# Patient Record
Sex: MALE | Race: WHITE | NOT HISPANIC OR LATINO | Employment: OTHER | URBAN - METROPOLITAN AREA
[De-identification: names, ages, dates, MRNs, and addresses within clinical notes are randomized per-mention and may not be internally consistent; named-entity substitution may affect disease eponyms.]

---

## 2017-01-18 ENCOUNTER — OFFICE VISIT (OUTPATIENT)
Dept: FAMILY MEDICINE | Facility: CLINIC | Age: 42
End: 2017-01-18
Payer: COMMERCIAL

## 2017-01-18 VITALS
HEART RATE: 74 BPM | BODY MASS INDEX: 36.2 KG/M2 | WEIGHT: 273.13 LBS | SYSTOLIC BLOOD PRESSURE: 120 MMHG | TEMPERATURE: 98 F | HEIGHT: 73 IN | DIASTOLIC BLOOD PRESSURE: 94 MMHG

## 2017-01-18 DIAGNOSIS — S43.402A SPRAIN SHOULDER/ARM, LEFT, INITIAL ENCOUNTER: Primary | ICD-10-CM

## 2017-01-18 PROCEDURE — 1159F MED LIST DOCD IN RCRD: CPT | Mod: S$GLB,,, | Performed by: FAMILY MEDICINE

## 2017-01-18 PROCEDURE — 99999 PR PBB SHADOW E&M-EST. PATIENT-LVL III: CPT | Mod: PBBFAC,,, | Performed by: FAMILY MEDICINE

## 2017-01-18 PROCEDURE — 3080F DIAST BP >= 90 MM HG: CPT | Mod: S$GLB,,, | Performed by: FAMILY MEDICINE

## 2017-01-18 PROCEDURE — 99213 OFFICE O/P EST LOW 20 MIN: CPT | Mod: S$GLB,,, | Performed by: FAMILY MEDICINE

## 2017-01-18 PROCEDURE — 3074F SYST BP LT 130 MM HG: CPT | Mod: S$GLB,,, | Performed by: FAMILY MEDICINE

## 2017-01-18 NOTE — PROGRESS NOTES
Subjective:       Patient ID: Tl Butts is a 41 y.o. male.    Chief Complaint: Shoulder Pain (pain on left shoulder for 6 months. Certain movements will cause the pain and it will radiate down the arm.Doesn't remember injurying it)    HPI Comments: C/o left anterior shoulder pain for the last 6 months.  He thinks boxing may have re-injured something.  Similar pains in the past.  He stopped boxing 6 months ago.  He feels pain with certain motions only.  It does radiate into the lateral wrist.  He feels entire arm feels weak.  Pain is worse with arm extension.      Past Medical History   Diagnosis Date    Chronic headaches     Elevated cholesterol     Hypertension     Lumbar spondylosis        Past Surgical History   Procedure Laterality Date    Knee surgery  age 16       Review of patient's allergies indicates:   Allergen Reactions    Penicillins      Other reaction(s): Unknown       Social History     Social History    Marital status:      Spouse name: N/A    Number of children: 3    Years of education: N/A     Occupational History    heavy equipment repair shop owner      Social History Main Topics    Smoking status: Never Smoker    Smokeless tobacco: Never Used    Alcohol use No    Drug use: Not on file    Sexual activity: Not on file     Other Topics Concern    Not on file     Social History Narrative       Current Outpatient Prescriptions on File Prior to Visit   Medication Sig Dispense Refill    fenofibrate micronized (LOFIBRA) 134 MG Cap Take 1 capsule (134 mg total) by mouth once daily. 30 capsule 11    fluticasone (FLONASE) 50 mcg/actuation nasal spray 1 spray by Each Nare route once daily. 1 Bottle 5    lisinopril 10 MG tablet Take 1 tablet (10 mg total) by mouth once daily. 30 tablet 11     No current facility-administered medications on file prior to visit.        Family History   Problem Relation Age of Onset    Hepatitis Mother     Diabetes Father     Diabetes Sister   "   Cancer Maternal Grandmother      lung    Cancer Maternal Grandfather      lung    Hepatitis Mother        Review of Systems   Constitutional: Negative for appetite change, chills, fever and unexpected weight change.   HENT: Negative for sore throat and trouble swallowing.    Eyes: Negative for pain and visual disturbance.   Respiratory: Negative for cough, chest tightness, shortness of breath and wheezing.    Cardiovascular: Negative for chest pain, palpitations and leg swelling.   Gastrointestinal: Negative for abdominal pain, blood in stool, constipation, diarrhea and nausea.   Genitourinary: Negative for difficulty urinating, dysuria and hematuria.   Musculoskeletal: Positive for arthralgias. Negative for gait problem and neck pain.   Skin: Negative for rash and wound.   Neurological: Negative for dizziness, weakness, light-headedness and headaches.   Hematological: Negative for adenopathy.   Psychiatric/Behavioral: Negative for dysphoric mood.       Objective:      Visit Vitals    BP (!) 120/94    Pulse 74    Temp 97.8 °F (36.6 °C) (Oral)    Ht 6' 1" (1.854 m)    Wt 123.9 kg (273 lb 2.4 oz)    BMI 36.04 kg/m2     Physical Exam   Musculoskeletal:        Right shoulder: He exhibits normal range of motion, no tenderness, no bony tenderness, no deformity, no pain and no spasm.        Left shoulder: He exhibits pain (anterior and worse with arm extension. pain with resisted abduction). He exhibits normal range of motion.        Arms:      Assessment:       1. Sprain shoulder/arm, left, initial encounter        Plan:       Sprain shoulder/arm, left, initial encounter  -     Ambulatory Referral to Physical/Occupational Therapy        Basic arm stretching and lifting precautions  PT referral for assitance for likely left deltoid strain  F/u PRN if not improving  "

## 2017-06-19 ENCOUNTER — TELEPHONE (OUTPATIENT)
Dept: FAMILY MEDICINE | Facility: CLINIC | Age: 42
End: 2017-06-19

## 2017-06-19 DIAGNOSIS — Z00.00 ROUTINE GENERAL MEDICAL EXAMINATION AT A HEALTH CARE FACILITY: ICD-10-CM

## 2017-06-19 DIAGNOSIS — Z13.6 SCREENING FOR ISCHEMIC HEART DISEASE: Primary | ICD-10-CM

## 2017-06-19 NOTE — TELEPHONE ENCOUNTER
Orders entered per written order guidelines.  Message left for patient to call and scheduled lab appointment.

## 2017-06-19 NOTE — TELEPHONE ENCOUNTER
----- Message from Wanda Salvador sent at 6/19/2017  9:00 AM CDT -----  Order for blood work.  Please call wife/Fanny at [phone]

## 2017-07-03 DIAGNOSIS — E78.5 HYPERLIPIDEMIA: ICD-10-CM

## 2017-07-03 DIAGNOSIS — I10 ESSENTIAL HYPERTENSION: ICD-10-CM

## 2017-07-03 RX ORDER — LISINOPRIL 10 MG/1
TABLET ORAL
Qty: 30 TABLET | Refills: 1 | Status: SHIPPED | OUTPATIENT
Start: 2017-07-03 | End: 2017-08-15 | Stop reason: SDUPTHER

## 2017-07-03 RX ORDER — FENOFIBRATE 134 MG/1
CAPSULE ORAL
Qty: 30 CAPSULE | Refills: 1 | Status: SHIPPED | OUTPATIENT
Start: 2017-07-03 | End: 2017-08-15

## 2017-08-01 DIAGNOSIS — J34.2 DEFLECTED NASAL SEPTUM: ICD-10-CM

## 2017-08-01 DIAGNOSIS — R09.81 NASAL CONGESTION: ICD-10-CM

## 2017-08-02 RX ORDER — FLUTICASONE PROPIONATE 50 MCG
SPRAY, SUSPENSION (ML) NASAL
Qty: 1 BOTTLE | Refills: 6 | Status: SHIPPED | OUTPATIENT
Start: 2017-08-02 | End: 2018-08-15 | Stop reason: SDUPTHER

## 2017-08-07 ENCOUNTER — LAB VISIT (OUTPATIENT)
Dept: LAB | Facility: HOSPITAL | Age: 42
End: 2017-08-07
Attending: FAMILY MEDICINE
Payer: COMMERCIAL

## 2017-08-07 DIAGNOSIS — Z00.00 ROUTINE GENERAL MEDICAL EXAMINATION AT A HEALTH CARE FACILITY: ICD-10-CM

## 2017-08-07 DIAGNOSIS — Z13.6 SCREENING FOR ISCHEMIC HEART DISEASE: ICD-10-CM

## 2017-08-07 LAB
ALBUMIN SERPL BCP-MCNC: 3.8 G/DL
ALP SERPL-CCNC: 84 U/L
ALT SERPL W/O P-5'-P-CCNC: 44 U/L
ANION GAP SERPL CALC-SCNC: 9 MMOL/L
AST SERPL-CCNC: 24 U/L
BILIRUB SERPL-MCNC: 0.4 MG/DL
BUN SERPL-MCNC: 21 MG/DL
CALCIUM SERPL-MCNC: 9.1 MG/DL
CHLORIDE SERPL-SCNC: 109 MMOL/L
CHOLEST/HDLC SERPL: 5.1 {RATIO}
CO2 SERPL-SCNC: 24 MMOL/L
CREAT SERPL-MCNC: 1.1 MG/DL
EST. GFR  (AFRICAN AMERICAN): >60 ML/MIN/1.73 M^2
EST. GFR  (NON AFRICAN AMERICAN): >60 ML/MIN/1.73 M^2
GLUCOSE SERPL-MCNC: 123 MG/DL
HDL/CHOLESTEROL RATIO: 19.4 %
HDLC SERPL-MCNC: 175 MG/DL
HDLC SERPL-MCNC: 34 MG/DL
LDLC SERPL CALC-MCNC: 100.4 MG/DL
NONHDLC SERPL-MCNC: 141 MG/DL
POTASSIUM SERPL-SCNC: 4.6 MMOL/L
PROT SERPL-MCNC: 6.8 G/DL
SODIUM SERPL-SCNC: 142 MMOL/L
TRIGL SERPL-MCNC: 203 MG/DL

## 2017-08-07 PROCEDURE — 80053 COMPREHEN METABOLIC PANEL: CPT

## 2017-08-07 PROCEDURE — 36415 COLL VENOUS BLD VENIPUNCTURE: CPT | Mod: PO

## 2017-08-07 PROCEDURE — 80061 LIPID PANEL: CPT

## 2017-08-15 ENCOUNTER — OFFICE VISIT (OUTPATIENT)
Dept: FAMILY MEDICINE | Facility: CLINIC | Age: 42
End: 2017-08-15
Payer: COMMERCIAL

## 2017-08-15 VITALS
DIASTOLIC BLOOD PRESSURE: 82 MMHG | SYSTOLIC BLOOD PRESSURE: 122 MMHG | TEMPERATURE: 98 F | WEIGHT: 279.13 LBS | HEART RATE: 64 BPM | BODY MASS INDEX: 36.99 KG/M2 | RESPIRATION RATE: 18 BRPM | HEIGHT: 73 IN

## 2017-08-15 DIAGNOSIS — E78.5 HYPERLIPIDEMIA, UNSPECIFIED HYPERLIPIDEMIA TYPE: ICD-10-CM

## 2017-08-15 DIAGNOSIS — Z00.00 PREVENTATIVE HEALTH CARE: Primary | ICD-10-CM

## 2017-08-15 DIAGNOSIS — I10 ESSENTIAL HYPERTENSION: ICD-10-CM

## 2017-08-15 DIAGNOSIS — R73.09 ABNORMAL GLUCOSE: ICD-10-CM

## 2017-08-15 PROCEDURE — 99396 PREV VISIT EST AGE 40-64: CPT | Mod: S$GLB,,, | Performed by: FAMILY MEDICINE

## 2017-08-15 PROCEDURE — 99999 PR PBB SHADOW E&M-EST. PATIENT-LVL III: CPT | Mod: PBBFAC,,, | Performed by: FAMILY MEDICINE

## 2017-08-15 RX ORDER — LISINOPRIL 10 MG/1
TABLET ORAL
Qty: 30 TABLET | Refills: 11 | Status: SHIPPED | OUTPATIENT
Start: 2017-08-15 | End: 2017-11-15 | Stop reason: SDUPTHER

## 2017-08-15 RX ORDER — PHENTERMINE HYDROCHLORIDE 37.5 MG/1
37.5 TABLET ORAL
Qty: 30 TABLET | Refills: 2 | Status: SHIPPED | OUTPATIENT
Start: 2017-08-15 | End: 2017-09-14

## 2017-08-15 NOTE — PROGRESS NOTES
Subjective:       Patient ID: Tl Butts is a 42 y.o. male.    Chief Complaint: Annual Exam    HPI Comments:   Here for annual exam and to f/u on chronic health issues.    HTN - tolerating Lisinopril 10mg daily with good control.  HLD - was tolerating fenofibrate 134 mg daily, but stopped this 2 months ago due to fatigue  Chronic headaches - gets HA 5 times a month; uses Advil PRN    Past Medical History:   Diagnosis Date    Chronic headaches     Elevated cholesterol     Hypertension     Lumbar spondylosis        Past Surgical History:   Procedure Laterality Date    KNEE SURGERY  age 16       Allergies   Allergen Reactions    Penicillins      Other reaction(s): Unknown       Social History     Social History    Marital status:      Spouse name: N/A    Number of children: 3    Years of education: N/A     Occupational History    heavy equipment repair shop owner      Social History Main Topics    Smoking status: Never Smoker    Smokeless tobacco: Never Used    Alcohol use No    Drug use: Unknown    Sexual activity: Not on file     Other Topics Concern    Not on file     Social History Narrative    No narrative on file       Current Outpatient Prescriptions on File Prior to Visit   Medication Sig Dispense Refill    fluticasone (FLONASE) 50 mcg/actuation nasal spray USE 1 SPRAY IN EACH NOSTRIL ONCE DAILY. 1 Bottle 6    [DISCONTINUED] fenofibrate micronized (LOFIBRA) 134 MG Cap TAKE 1 CAPSULE(134 MG) BY MOUTH EVERY DAY 30 capsule 1    [DISCONTINUED] lisinopril 10 MG tablet TAKE 1 TABLET(10 MG) BY MOUTH EVERY DAY 30 tablet 1     No current facility-administered medications on file prior to visit.        Family History   Problem Relation Age of Onset    Hepatitis Mother     Diabetes Father     Diabetes Sister     Cancer Maternal Grandmother      lung    Cancer Maternal Grandfather      lung       Hypertension  Associated symptoms include headaches and palpitations. Pertinent negatives  "include no chest pain, neck pain or shortness of breath.   Hyperlipidemia  Pertinent negatives include no chest pain, myalgias or shortness of breath.     Review of Systems   Constitutional: Negative for fever, appetite change, fatigue and unexpected weight change.   HENT: Negative for ear pain, nosebleeds, congestion, sore throat, rhinorrhea, trouble swallowing, neck pain, postnasal drip, sinus pressure and ear discharge.    Eyes: Negative for pain, discharge, redness and visual disturbance.   Respiratory: Negative for apnea, cough, chest tightness, shortness of breath and wheezing.    Cardiovascular: Negative for chest pain and leg swelling.   Gastrointestinal: Negative for nausea, vomiting, abdominal pain, diarrhea, constipation, blood in stool and abdominal distention.   Musculoskeletal: Negative for myalgias, back pain, joint swelling and gait problem.   Skin: Negative for color change, rash and wound.   Neurological: Negative for dizziness, tremors, seizures, syncope, speech difficulty, weakness, light-headedness and numbness. Regular headaches  Hematological: Negative for adenopathy. Does not bruise/bleed easily.   Psychiatric/Behavioral: Negative for suicidal ideas, hallucinations, behavioral problems and confusion.       Objective:      /82 (BP Location: Left arm, Patient Position: Sitting, BP Method: Large (Manual))   Pulse 64   Temp 98.4 °F (36.9 °C) (Oral)   Resp 18   Ht 6' 1" (1.854 m)   Wt 126.6 kg (279 lb 1.6 oz)   BMI 36.82 kg/m²     Physical Exam   Constitutional: He is oriented to person, place, and time. He appears well-developed and well-nourished. He is active and cooperative.   HENT:   Head: Normocephalic and atraumatic.   Nose: Nose normal.   Mouth/Throat: Oropharynx is clear and moist. No oropharyngeal exudate.   Eyes: Conjunctivae and EOM are normal. Pupils are equal, round, and reactive to light. Right eye exhibits no discharge. Left eye exhibits no discharge. No scleral " icterus.   Neck: Trachea normal, normal range of motion and full passive range of motion without pain. Neck supple. Normal carotid pulses and no JVD present. Carotid bruit is not present. No tracheal deviation present. No mass and no thyromegaly present.   Cardiovascular: Normal rate, regular rhythm, S1 normal, S2 normal, normal heart sounds and intact distal pulses.  Exam reveals no gallop and no friction rub.    No murmur heard.  Pulmonary/Chest: Effort normal and breath sounds normal. No respiratory distress. He has no wheezes. He has no rales.   Abdominal: Soft. Normal appearance and bowel sounds are normal. He exhibits no distension and no mass. There is no hepatosplenomegaly. There is no tenderness. There is no rebound and no guarding. reducible umbilical hernia.  Neurological: He is alert and oriented to person, place, and time. He has normal strength. No cranial nerve deficit. Coordination normal.   Skin: Skin is warm, dry and intact. No rash noted. No cyanosis or erythema. No pallor. Nails show no clubbing.   Psychiatric: He has a normal mood and affect. His speech is normal and behavior is normal. Judgment and thought content normal.       Results for orders placed or performed in visit on 08/07/17   Comprehensive metabolic panel   Result Value Ref Range    Sodium 142 136 - 145 mmol/L    Potassium 4.6 3.5 - 5.1 mmol/L    Chloride 109 95 - 110 mmol/L    CO2 24 23 - 29 mmol/L    Glucose 123 (H) 70 - 110 mg/dL    BUN, Bld 21 (H) 6 - 20 mg/dL    Creatinine 1.1 0.5 - 1.4 mg/dL    Calcium 9.1 8.7 - 10.5 mg/dL    Total Protein 6.8 6.0 - 8.4 g/dL    Albumin 3.8 3.5 - 5.2 g/dL    Total Bilirubin 0.4 0.1 - 1.0 mg/dL    Alkaline Phosphatase 84 55 - 135 U/L    AST 24 10 - 40 U/L    ALT 44 10 - 44 U/L    Anion Gap 9 8 - 16 mmol/L    eGFR if African American >60.0 >60 mL/min/1.73 m^2    eGFR if non African American >60.0 >60 mL/min/1.73 m^2   Lipid panel   Result Value Ref Range    Cholesterol 175 120 - 199 mg/dL     Triglycerides 203 (H) 30 - 150 mg/dL    HDL 34 (L) 40 - 75 mg/dL    LDL Cholesterol 100.4 63.0 - 159.0 mg/dL    HDL/Chol Ratio 19.4 (L) 20.0 - 50.0 %    Total Cholesterol/HDL Ratio 5.1 (H) 2.0 - 5.0    Non-HDL Cholesterol 141 mg/dL     Assessment:       1. Preventative health care    2. Essential hypertension    3. Hyperlipidemia, unspecified hyperlipidemia type    4. Abnormal glucose        Plan:       Preventative health care    Essential hypertension  -     lisinopril 10 MG tablet; TAKE 1 TABLET(10 MG) BY MOUTH EVERY DAY  Dispense: 30 tablet; Refill: 11    Hyperlipidemia, unspecified hyperlipidemia type  -     Comprehensive metabolic panel; Future; Expected date: 11/13/2017  -     Lipid panel; Future; Expected date: 11/13/2017    Abnormal glucose  -     Hemoglobin A1c; Future; Expected date: 11/13/2017    Other orders  -     phentermine (ADIPEX-P) 37.5 mg tablet; Take 1 tablet (37.5 mg total) by mouth before breakfast.  Dispense: 30 tablet; Refill: 2    discussed abnormal glucose  Will try aggressive weight loss  Goal weight 225  Adipex for 3 months with f/u labs then  continue present meds  Counseled on regular exercise, maintenance of a healthy weight, balanced diet rich in fruits/vegetables and lean protein, and avoidance of unhealthy habits like smoking and excessive alcohol intake.      F/u 3 months with CMP, lipid profile, Hgb A1c

## 2017-11-08 ENCOUNTER — LAB VISIT (OUTPATIENT)
Dept: LAB | Facility: HOSPITAL | Age: 42
End: 2017-11-08
Attending: FAMILY MEDICINE
Payer: COMMERCIAL

## 2017-11-08 DIAGNOSIS — R73.09 ABNORMAL GLUCOSE: ICD-10-CM

## 2017-11-08 DIAGNOSIS — E78.5 HYPERLIPIDEMIA, UNSPECIFIED HYPERLIPIDEMIA TYPE: ICD-10-CM

## 2017-11-08 LAB
ALBUMIN SERPL BCP-MCNC: 4 G/DL
ALP SERPL-CCNC: 104 U/L
ALT SERPL W/O P-5'-P-CCNC: 30 U/L
ANION GAP SERPL CALC-SCNC: 6 MMOL/L
AST SERPL-CCNC: 20 U/L
BILIRUB SERPL-MCNC: 0.6 MG/DL
BUN SERPL-MCNC: 20 MG/DL
CALCIUM SERPL-MCNC: 9.6 MG/DL
CHLORIDE SERPL-SCNC: 107 MMOL/L
CHOLEST SERPL-MCNC: 142 MG/DL
CHOLEST/HDLC SERPL: 4.6 {RATIO}
CO2 SERPL-SCNC: 27 MMOL/L
CREAT SERPL-MCNC: 1 MG/DL
EST. GFR  (AFRICAN AMERICAN): >60 ML/MIN/1.73 M^2
EST. GFR  (NON AFRICAN AMERICAN): >60 ML/MIN/1.73 M^2
ESTIMATED AVG GLUCOSE: 105 MG/DL
GLUCOSE SERPL-MCNC: 99 MG/DL
HBA1C MFR BLD HPLC: 5.3 %
HDLC SERPL-MCNC: 31 MG/DL
HDLC SERPL: 21.8 %
LDLC SERPL CALC-MCNC: 87.6 MG/DL
NONHDLC SERPL-MCNC: 111 MG/DL
POTASSIUM SERPL-SCNC: 4.5 MMOL/L
PROT SERPL-MCNC: 7.1 G/DL
SODIUM SERPL-SCNC: 140 MMOL/L
TRIGL SERPL-MCNC: 117 MG/DL

## 2017-11-08 PROCEDURE — 80061 LIPID PANEL: CPT

## 2017-11-08 PROCEDURE — 80053 COMPREHEN METABOLIC PANEL: CPT

## 2017-11-08 PROCEDURE — 83036 HEMOGLOBIN GLYCOSYLATED A1C: CPT

## 2017-11-08 PROCEDURE — 36415 COLL VENOUS BLD VENIPUNCTURE: CPT | Mod: PO

## 2017-11-15 ENCOUNTER — OFFICE VISIT (OUTPATIENT)
Dept: FAMILY MEDICINE | Facility: CLINIC | Age: 42
End: 2017-11-15
Payer: COMMERCIAL

## 2017-11-15 VITALS
WEIGHT: 245.56 LBS | HEIGHT: 73 IN | DIASTOLIC BLOOD PRESSURE: 80 MMHG | SYSTOLIC BLOOD PRESSURE: 112 MMHG | HEART RATE: 65 BPM | OXYGEN SATURATION: 98 % | BODY MASS INDEX: 32.54 KG/M2

## 2017-11-15 DIAGNOSIS — E78.5 HYPERLIPIDEMIA, UNSPECIFIED HYPERLIPIDEMIA TYPE: ICD-10-CM

## 2017-11-15 DIAGNOSIS — I10 ESSENTIAL HYPERTENSION: Primary | ICD-10-CM

## 2017-11-15 DIAGNOSIS — R73.09 ABNORMAL GLUCOSE: ICD-10-CM

## 2017-11-15 PROCEDURE — 99214 OFFICE O/P EST MOD 30 MIN: CPT | Mod: S$GLB,,, | Performed by: FAMILY MEDICINE

## 2017-11-15 PROCEDURE — 99999 PR PBB SHADOW E&M-EST. PATIENT-LVL III: CPT | Mod: PBBFAC,,, | Performed by: FAMILY MEDICINE

## 2017-11-15 RX ORDER — PHENTERMINE HYDROCHLORIDE 37.5 MG/1
1 TABLET ORAL ONCE
Refills: 2 | COMMUNITY
Start: 2017-10-14 | End: 2017-11-15 | Stop reason: SDUPTHER

## 2017-11-15 RX ORDER — PHENTERMINE HYDROCHLORIDE 37.5 MG/1
37.5 TABLET ORAL
Qty: 30 TABLET | Refills: 2 | Status: SHIPPED | OUTPATIENT
Start: 2017-11-15 | End: 2018-02-15

## 2017-11-15 RX ORDER — LISINOPRIL 10 MG/1
5 TABLET ORAL DAILY
Qty: 30 TABLET | Refills: 11
Start: 2017-11-15 | End: 2018-02-15

## 2017-11-15 NOTE — PROGRESS NOTES
Subjective:       Patient ID: Tl Butts is a 42 y.o. male.    Chief Complaint: Hyperlipidemia (3 mos w/labs) and Hypertension    HPI Comments:   Here for f/u on chronic health issues.  Tolerated Adipex for 3 months. Lost 35 pounds in 3 months. Exercising on treadmill 5 days a week.  GERD resolved with weight loss.  HTN - tolerating Lisinopril 10mg daily with good control. Some faintness  HLD - following low-fat diet  Chronic headaches - gets HA 5 times a month; uses Advil PRN    Past Medical History:   Diagnosis Date    Chronic headaches     Elevated cholesterol     Hypertension     Lumbar spondylosis        Past Surgical History:   Procedure Laterality Date    KNEE SURGERY  age 16       Allergies   Allergen Reactions    Penicillins      Other reaction(s): Unknown       Social History     Social History    Marital status:      Spouse name: N/A    Number of children: 3    Years of education: N/A     Occupational History    heavy equipment repair shop owner      Social History Main Topics    Smoking status: Never Smoker    Smokeless tobacco: Never Used    Alcohol use No    Drug use: Unknown    Sexual activity: Not on file     Other Topics Concern    Not on file     Social History Narrative    No narrative on file       Current Outpatient Prescriptions on File Prior to Visit   Medication Sig Dispense Refill    fluticasone (FLONASE) 50 mcg/actuation nasal spray USE 1 SPRAY IN EACH NOSTRIL ONCE DAILY. 1 Bottle 6    [DISCONTINUED] lisinopril 10 MG tablet TAKE 1 TABLET(10 MG) BY MOUTH EVERY DAY 30 tablet 11     No current facility-administered medications on file prior to visit.        Family History   Problem Relation Age of Onset    Hepatitis Mother     Diabetes Father     Diabetes Sister     Cancer Maternal Grandmother      lung    Cancer Maternal Grandfather      lung       Hypertension  Associated symptoms include headaches and palpitations. Pertinent negatives include no chest pain,  "neck pain or shortness of breath.   Hyperlipidemia  Pertinent negatives include no chest pain, myalgias or shortness of breath.     Review of Systems   Constitutional: Negative for fever, appetite change, fatigue and unexpected weight change.   HENT: Negative for ear pain, nosebleeds, congestion, sore throat, rhinorrhea, trouble swallowing, neck pain, postnasal drip, sinus pressure and ear discharge.    Eyes: Negative for pain, discharge, redness and visual disturbance.   Respiratory: Negative for apnea, cough, chest tightness, shortness of breath and wheezing.    Cardiovascular: Negative for chest pain and leg swelling.   Gastrointestinal: Negative for nausea, vomiting, abdominal pain, diarrhea, constipation, blood in stool and abdominal distention.   Musculoskeletal: Negative for myalgias, back pain, joint swelling and gait problem.   Skin: Negative for color change, rash and wound.   Neurological: Negative for dizziness, tremors, seizures, syncope, speech difficulty, weakness, light-headedness and numbness. Regular headaches  Hematological: Negative for adenopathy. Does not bruise/bleed easily.   Psychiatric/Behavioral: Negative for suicidal ideas, hallucinations, behavioral problems and confusion.       Objective:      /80   Pulse 65   Ht 6' 1" (1.854 m)   Wt 111.4 kg (245 lb 9.5 oz)   SpO2 98%   BMI 32.40 kg/m²     Physical Exam   Constitutional: He is oriented to person, place, and time. He appears well-developed and well-nourished. He is active and cooperative.   HENT:   Head: Normocephalic and atraumatic.   Nose: Nose normal.   Mouth/Throat: Oropharynx is clear and moist. No oropharyngeal exudate.   Eyes: Conjunctivae and EOM are normal. Pupils are equal, round, and reactive to light. Right eye exhibits no discharge. Left eye exhibits no discharge. No scleral icterus.   Neck: Trachea normal, normal range of motion and full passive range of motion without pain. Neck supple. Normal carotid pulses " and no JVD present. Carotid bruit is not present. No tracheal deviation present. No mass and no thyromegaly present.   Cardiovascular: Normal rate, regular rhythm, S1 normal, S2 normal, normal heart sounds and intact distal pulses.  Exam reveals no gallop and no friction rub.    No murmur heard.  Pulmonary/Chest: Effort normal and breath sounds normal. No respiratory distress. He has no wheezes. He has no rales.   Abdominal: Soft. Normal appearance and bowel sounds are normal. He exhibits no distension and no mass. There is no hepatosplenomegaly. There is no tenderness. There is no rebound and no guarding. reducible umbilical hernia.  Neurological: He is alert and oriented to person, place, and time. He has normal strength. No cranial nerve deficit. Coordination normal.   Skin: Skin is warm, dry and intact. No rash noted. No cyanosis or erythema. No pallor. Nails show no clubbing.   Psychiatric: He has a normal mood and affect. His speech is normal and behavior is normal. Judgment and thought content normal.       Results for orders placed or performed in visit on 11/08/17   Comprehensive metabolic panel   Result Value Ref Range    Sodium 140 136 - 145 mmol/L    Potassium 4.5 3.5 - 5.1 mmol/L    Chloride 107 95 - 110 mmol/L    CO2 27 23 - 29 mmol/L    Glucose 99 70 - 110 mg/dL    BUN, Bld 20 6 - 20 mg/dL    Creatinine 1.0 0.5 - 1.4 mg/dL    Calcium 9.6 8.7 - 10.5 mg/dL    Total Protein 7.1 6.0 - 8.4 g/dL    Albumin 4.0 3.5 - 5.2 g/dL    Total Bilirubin 0.6 0.1 - 1.0 mg/dL    Alkaline Phosphatase 104 55 - 135 U/L    AST 20 10 - 40 U/L    ALT 30 10 - 44 U/L    Anion Gap 6 (L) 8 - 16 mmol/L    eGFR if African American >60.0 >60 mL/min/1.73 m^2    eGFR if non African American >60.0 >60 mL/min/1.73 m^2   Hemoglobin A1c   Result Value Ref Range    Hemoglobin A1C 5.3 4.0 - 5.6 %    Estimated Avg Glucose 105 68 - 131 mg/dL   Lipid panel   Result Value Ref Range    Cholesterol 142 120 - 199 mg/dL    Triglycerides 117 30 -  150 mg/dL    HDL 31 (L) 40 - 75 mg/dL    LDL Cholesterol 87.6 63.0 - 159.0 mg/dL    HDL/Chol Ratio 21.8 20.0 - 50.0 %    Total Cholesterol/HDL Ratio 4.6 2.0 - 5.0    Non-HDL Cholesterol 111 mg/dL     Assessment:       1. Essential hypertension    2. Hyperlipidemia, unspecified hyperlipidemia type    3. Abnormal glucose        Plan:       Essential hypertension  -     lisinopril 10 MG tablet; Take 0.5 tablets (5 mg total) by mouth once daily.  Dispense: 30 tablet; Refill: 11    Hyperlipidemia, unspecified hyperlipidemia type    Abnormal glucose    Other orders  -     phentermine (ADIPEX-P) 37.5 mg tablet; Take 1 tablet (37.5 mg total) by mouth before breakfast.  Dispense: 30 tablet; Refill: 2    doing well  Continue weight loss efforts  Goal weight 225  Adipex for 3 months  Reduce lisinopril to 5mg due to periodic hypotension  Counseled on regular exercise, maintenance of a healthy weight, balanced diet rich in fruits/vegetables and lean protein, and avoidance of unhealthy habits like smoking and excessive alcohol intake.    F/u 3 months with weight log

## 2018-02-15 ENCOUNTER — OFFICE VISIT (OUTPATIENT)
Dept: FAMILY MEDICINE | Facility: CLINIC | Age: 43
End: 2018-02-15
Payer: COMMERCIAL

## 2018-02-15 VITALS
HEIGHT: 73 IN | HEART RATE: 82 BPM | WEIGHT: 235.69 LBS | RESPIRATION RATE: 18 BRPM | SYSTOLIC BLOOD PRESSURE: 122 MMHG | OXYGEN SATURATION: 99 % | DIASTOLIC BLOOD PRESSURE: 90 MMHG | BODY MASS INDEX: 31.24 KG/M2

## 2018-02-15 DIAGNOSIS — I10 ESSENTIAL HYPERTENSION: Primary | ICD-10-CM

## 2018-02-15 PROCEDURE — 99213 OFFICE O/P EST LOW 20 MIN: CPT | Mod: S$GLB,,, | Performed by: FAMILY MEDICINE

## 2018-02-15 PROCEDURE — 99999 PR PBB SHADOW E&M-EST. PATIENT-LVL III: CPT | Mod: PBBFAC,,, | Performed by: FAMILY MEDICINE

## 2018-02-15 PROCEDURE — 3008F BODY MASS INDEX DOCD: CPT | Mod: S$GLB,,, | Performed by: FAMILY MEDICINE

## 2018-02-15 NOTE — PROGRESS NOTES
Subjective:       Patient ID: Tl Butts is a 42 y.o. male.    Chief Complaint: Follow-up (3 mos)    HPI Comments:   Here for f/u on chronic health issues.  Tolerated Adipex for 6 months. Lost 45 pounds in 6 months and 10 pounds in the last 3 months. Exercising on treadmill 5 days a week.  Doing some weight training now.  GERD resolved with weight loss.  HTN - he stopped Lisinopril 5mg daily 2 months ago due to persistent faintness. Home BP has been normal.  HLD - following low-fat diet  Chronic headaches - gets HA 5 times a month; uses Advil PRN    Past Medical History:   Diagnosis Date    Chronic headaches     Elevated cholesterol     Hypertension     Lumbar spondylosis        Past Surgical History:   Procedure Laterality Date    KNEE SURGERY  age 16       Allergies   Allergen Reactions    Penicillins      Other reaction(s): Unknown       Social History     Social History    Marital status:      Spouse name: N/A    Number of children: 3    Years of education: N/A     Occupational History    heavy equipment repair shop owner      Social History Main Topics    Smoking status: Never Smoker    Smokeless tobacco: Never Used    Alcohol use No    Drug use: Unknown    Sexual activity: Not on file     Other Topics Concern    Not on file     Social History Narrative    No narrative on file       Current Outpatient Prescriptions on File Prior to Visit   Medication Sig Dispense Refill    [DISCONTINUED] phentermine (ADIPEX-P) 37.5 mg tablet Take 1 tablet (37.5 mg total) by mouth before breakfast. 30 tablet 2    fluticasone (FLONASE) 50 mcg/actuation nasal spray USE 1 SPRAY IN EACH NOSTRIL ONCE DAILY. 1 Bottle 6    [DISCONTINUED] lisinopril 10 MG tablet Take 0.5 tablets (5 mg total) by mouth once daily. 30 tablet 11     No current facility-administered medications on file prior to visit.        Family History   Problem Relation Age of Onset    Hepatitis Mother     Diabetes Father     Diabetes  "Sister     Cancer Maternal Grandmother      lung    Cancer Maternal Grandfather      lung       Hypertension  Associated symptoms include headaches and palpitations. Pertinent negatives include no chest pain, neck pain or shortness of breath.     Review of Systems   Constitutional: Negative for fever, appetite change, fatigue and unexpected weight change.   HENT: Negative for ear pain, nosebleeds, congestion, sore throat, rhinorrhea, trouble swallowing, neck pain, postnasal drip, sinus pressure and ear discharge.    Eyes: Negative for pain, discharge, redness and visual disturbance.   Respiratory: Negative for apnea, cough, chest tightness, shortness of breath and wheezing.    Cardiovascular: Negative for chest pain and leg swelling.   Gastrointestinal: Negative for nausea, vomiting, abdominal pain, diarrhea, constipation, blood in stool and abdominal distention.   Musculoskeletal: Negative for myalgias, back pain, joint swelling and gait problem.   Skin: Negative for color change, rash and wound.   Neurological: Negative for dizziness, tremors, seizures, syncope, speech difficulty, weakness, light-headedness and numbness. Regular headaches  Hematological: Negative for adenopathy. Does not bruise/bleed easily.   Psychiatric/Behavioral: Negative for suicidal ideas, hallucinations, behavioral problems and confusion.       Objective:      BP (!) 122/90   Pulse 82   Resp 18   Ht 6' 1" (1.854 m)   Wt 106.9 kg (235 lb 10.8 oz)   SpO2 99%   BMI 31.09 kg/m²     Physical Exam   Constitutional: He is oriented to person, place, and time. He appears well-developed and well-nourished. He is active and cooperative.   HENT:   Head: Normocephalic and atraumatic.   Nose: Nose normal.   Mouth/Throat: Oropharynx is clear and moist. No oropharyngeal exudate.   Eyes: Conjunctivae and EOM are normal. Pupils are equal, round, and reactive to light. Right eye exhibits no discharge. Left eye exhibits no discharge. No scleral " icterus.   Neck: Trachea normal, normal range of motion and full passive range of motion without pain. Neck supple. Normal carotid pulses and no JVD present. Carotid bruit is not present. No tracheal deviation present. No mass and no thyromegaly present.   Cardiovascular: Normal rate, regular rhythm, S1 normal, S2 normal, normal heart sounds and intact distal pulses.  Exam reveals no gallop and no friction rub.    No murmur heard.  Pulmonary/Chest: Effort normal and breath sounds normal. No respiratory distress. He has no wheezes. He has no rales.   Abdominal: Soft. Normal appearance and bowel sounds are normal. He exhibits no distension and no mass. There is no hepatosplenomegaly. There is no tenderness. There is no rebound and no guarding. reducible umbilical hernia.  Neurological: He is alert and oriented to person, place, and time. He has normal strength. No cranial nerve deficit. Coordination normal.   Skin: Skin is warm, dry and intact. No rash noted. No cyanosis or erythema. No pallor. Nails show no clubbing.   Psychiatric: He has a normal mood and affect. His speech is normal and behavior is normal. Judgment and thought content normal.       Results for orders placed or performed in visit on 11/08/17   Comprehensive metabolic panel   Result Value Ref Range    Sodium 140 136 - 145 mmol/L    Potassium 4.5 3.5 - 5.1 mmol/L    Chloride 107 95 - 110 mmol/L    CO2 27 23 - 29 mmol/L    Glucose 99 70 - 110 mg/dL    BUN, Bld 20 6 - 20 mg/dL    Creatinine 1.0 0.5 - 1.4 mg/dL    Calcium 9.6 8.7 - 10.5 mg/dL    Total Protein 7.1 6.0 - 8.4 g/dL    Albumin 4.0 3.5 - 5.2 g/dL    Total Bilirubin 0.6 0.1 - 1.0 mg/dL    Alkaline Phosphatase 104 55 - 135 U/L    AST 20 10 - 40 U/L    ALT 30 10 - 44 U/L    Anion Gap 6 (L) 8 - 16 mmol/L    eGFR if African American >60.0 >60 mL/min/1.73 m^2    eGFR if non African American >60.0 >60 mL/min/1.73 m^2   Hemoglobin A1c   Result Value Ref Range    Hemoglobin A1C 5.3 4.0 - 5.6 %     Estimated Avg Glucose 105 68 - 131 mg/dL   Lipid panel   Result Value Ref Range    Cholesterol 142 120 - 199 mg/dL    Triglycerides 117 30 - 150 mg/dL    HDL 31 (L) 40 - 75 mg/dL    LDL Cholesterol 87.6 63.0 - 159.0 mg/dL    HDL/Chol Ratio 21.8 20.0 - 50.0 %    Total Cholesterol/HDL Ratio 4.6 2.0 - 5.0    Non-HDL Cholesterol 111 mg/dL     Assessment:       1. Essential hypertension        Plan:       Essential hypertension    doing well  Continue weight loss efforts  Stop Adipex now since he has plataued  Goal weight 225  continue observation off BP medication  Counseled on regular exercise, maintenance of a healthy weight, balanced diet rich in fruits/vegetables and lean protein, and avoidance of unhealthy habits like smoking and excessive alcohol intake.  F/u 9 months for physical with labs

## 2018-04-24 ENCOUNTER — TELEPHONE (OUTPATIENT)
Dept: FAMILY MEDICINE | Facility: CLINIC | Age: 43
End: 2018-04-24

## 2018-04-24 NOTE — TELEPHONE ENCOUNTER
----- Message from Deidre Jefferson sent at 4/24/2018 10:47 AM CDT -----  Contact: Wife,Fanny Escobedo want to speak with a nurse regarding patient having back pain need some medical advice please call back at [phone]

## 2018-04-24 NOTE — TELEPHONE ENCOUNTER
Spoke to patient's wife. Patient is having back pain and she wants to know if Dr. Bell would see him first put in a referral. Informed her since this is a new problem Dr. Bell would need to see him first for the referral. She verbalized understanding. She consulted with patient about scheduling an appt with NP while on the phone. Patient says he would like to wait for Dr. Bell. Scheduled appt for patient to see Dr. Bell.

## 2018-05-22 ENCOUNTER — OFFICE VISIT (OUTPATIENT)
Dept: FAMILY MEDICINE | Facility: CLINIC | Age: 43
End: 2018-05-22
Payer: COMMERCIAL

## 2018-05-22 VITALS
HEIGHT: 73 IN | DIASTOLIC BLOOD PRESSURE: 90 MMHG | BODY MASS INDEX: 31.99 KG/M2 | SYSTOLIC BLOOD PRESSURE: 120 MMHG | RESPIRATION RATE: 18 BRPM | HEART RATE: 63 BPM | WEIGHT: 241.38 LBS | OXYGEN SATURATION: 99 %

## 2018-05-22 DIAGNOSIS — M54.16 LUMBAR RADICULOPATHY, RIGHT: Primary | ICD-10-CM

## 2018-05-22 PROCEDURE — 3008F BODY MASS INDEX DOCD: CPT | Mod: CPTII,S$GLB,, | Performed by: FAMILY MEDICINE

## 2018-05-22 PROCEDURE — 99213 OFFICE O/P EST LOW 20 MIN: CPT | Mod: S$GLB,,, | Performed by: FAMILY MEDICINE

## 2018-05-22 PROCEDURE — 99999 PR PBB SHADOW E&M-EST. PATIENT-LVL III: CPT | Mod: PBBFAC,,, | Performed by: FAMILY MEDICINE

## 2018-05-22 PROCEDURE — 3074F SYST BP LT 130 MM HG: CPT | Mod: CPTII,S$GLB,, | Performed by: FAMILY MEDICINE

## 2018-05-22 PROCEDURE — 3080F DIAST BP >= 90 MM HG: CPT | Mod: CPTII,S$GLB,, | Performed by: FAMILY MEDICINE

## 2018-05-22 NOTE — PROGRESS NOTES
Subjective:       Patient ID: Tl Butts is a 42 y.o. male.    Chief Complaint: Back Pain    C/o mostly right lower back sharp pain which seems to have progressed for the last year. It is constant 3/10, but worsens with prolonged standing and prolonged sitting.  It does radiate into the buttock and right groin and occasionally into the upper outer leg. The radiating pain feels like a burning.  Denies any weakness.  This is different from his regular back pain.  Not taking any regular medications.  It occasionally interferes with sleep  He did have MRI by chiropracter about 5 years ago. Surgery was suggested at that time.  He has h/o lumbar spondylosis with intermittent back pain for the past 10 years.        Past Medical History:   Diagnosis Date    Chronic headaches     Elevated cholesterol     Hypertension     Lumbar spondylosis        Past Surgical History:   Procedure Laterality Date    KNEE SURGERY  age 16       Review of patient's allergies indicates:   Allergen Reactions    Penicillins      Other reaction(s): Unknown       Social History     Social History    Marital status:      Spouse name: N/A    Number of children: 3    Years of education: N/A     Occupational History    heavy equipment repair shop owner      Social History Main Topics    Smoking status: Never Smoker    Smokeless tobacco: Never Used    Alcohol use No    Drug use: Unknown    Sexual activity: Not on file     Other Topics Concern    Not on file     Social History Narrative    No narrative on file       Current Outpatient Prescriptions on File Prior to Visit   Medication Sig Dispense Refill    fluticasone (FLONASE) 50 mcg/actuation nasal spray USE 1 SPRAY IN EACH NOSTRIL ONCE DAILY. 1 Bottle 6     No current facility-administered medications on file prior to visit.        Family History   Problem Relation Age of Onset    Hepatitis Mother     Diabetes Father     Diabetes Sister     Cancer Maternal Grandmother   "       lung    Cancer Maternal Grandfather         lung       Review of Systems   Constitutional: Negative for appetite change, chills, fever and unexpected weight change.   HENT: Negative for sore throat and trouble swallowing.    Eyes: Negative for pain and visual disturbance.   Respiratory: Negative for cough, chest tightness, shortness of breath and wheezing.    Cardiovascular: Negative for chest pain, palpitations and leg swelling.   Gastrointestinal: Negative for abdominal pain, blood in stool, constipation, diarrhea and nausea.   Genitourinary: Negative for difficulty urinating, dysuria and hematuria.   Musculoskeletal: Positive for back pain. Negative for arthralgias, gait problem and neck pain.   Skin: Negative for rash and wound.   Neurological: Negative for dizziness, weakness, light-headedness and headaches.   Hematological: Negative for adenopathy.   Psychiatric/Behavioral: Negative for dysphoric mood.       Objective:      BP (!) 120/90   Pulse 63   Resp 18   Ht 6' 1" (1.854 m)   Wt 109.5 kg (241 lb 6.5 oz)   SpO2 99%   BMI 31.85 kg/m²   Physical Exam   Constitutional: He is oriented to person, place, and time. He appears well-developed and well-nourished. He is active. No distress.   HENT:   Head: Normocephalic and atraumatic.   Right Ear: External ear normal.   Left Ear: External ear normal.   Mouth/Throat: Uvula is midline, oropharynx is clear and moist and mucous membranes are normal. No oropharyngeal exudate.   Eyes: Conjunctivae, EOM and lids are normal. Pupils are equal, round, and reactive to light.   Neck: Normal range of motion, full passive range of motion without pain and phonation normal. Neck supple. No thyroid mass and no thyromegaly present.   Cardiovascular: Normal rate, regular rhythm, normal heart sounds and intact distal pulses.  Exam reveals no gallop and no friction rub.    No murmur heard.  Pulmonary/Chest: Effort normal and breath sounds normal. No respiratory distress. " He has no wheezes. He has no rales.   Musculoskeletal: Normal range of motion.        Back:    Negative SLR bilaterally   Lymphadenopathy:     He has no cervical adenopathy.   Neurological: He is alert and oriented to person, place, and time. He has normal strength. No cranial nerve deficit or sensory deficit. Coordination normal.   Reflex Scores:       Patellar reflexes are 2+ on the right side and 2+ on the left side.       Achilles reflexes are 2+ on the right side and 2+ on the left side.  Skin: Skin is warm and dry.   Psychiatric: He has a normal mood and affect. His speech is normal and behavior is normal. Judgment and thought content normal.       Assessment:       1. Lumbar radiculopathy, right        Plan:       Lumbar radiculopathy, right  -     MRI Lumbar Spine Without Contrast; Future; Expected date: 05/22/2018        Suspect likely right L2/L3 lumbar DDD  Discussed regular stretching and lifting precautions  declines medication  Will call with MRI results and further guidance

## 2018-06-11 ENCOUNTER — HOSPITAL ENCOUNTER (OUTPATIENT)
Dept: RADIOLOGY | Facility: HOSPITAL | Age: 43
Discharge: HOME | End: 2018-06-11
Attending: FAMILY MEDICINE
Payer: COMMERCIAL

## 2018-06-11 DIAGNOSIS — M54.16 LUMBAR RADICULOPATHY, RIGHT: ICD-10-CM

## 2018-06-11 PROCEDURE — 72148 MRI LUMBAR SPINE W/O DYE: CPT | Mod: TC,PO

## 2018-06-11 PROCEDURE — 72148 MRI LUMBAR SPINE W/O DYE: CPT | Mod: 26,,, | Performed by: RADIOLOGY

## 2018-06-14 ENCOUNTER — TELEPHONE (OUTPATIENT)
Dept: FAMILY MEDICINE | Facility: CLINIC | Age: 43
End: 2018-06-14

## 2018-06-14 DIAGNOSIS — M54.16 LUMBAR RADICULOPATHY, RIGHT: Primary | ICD-10-CM

## 2018-06-14 NOTE — TELEPHONE ENCOUNTER
----- Message from Wily Strong sent at 6/14/2018  3:28 PM CDT -----  Contact: same  Type:  Test Results    Who Called:  patient  Name of Test (Lab/Mammo/Etc):  MRI  Date of Test:  6/11/18  Ordering Provider:  Arabella  Where the test was performed:  [address] [facility] [address]  Best Call Back Number:  [phone]  Additional Information:  n/a

## 2018-06-15 NOTE — TELEPHONE ENCOUNTER
Inform patient that MRI did show some bulging discs in the lower lumbar spine as well as some arthritis.  The findings were mild to moderate, but are likely generating his pain.  Nothing looks surgical, so I would consider conservative pain management at this time.  I would recommend consultation with one of our interventional pain specialists to consider an injection and have entered a referral.

## 2018-07-31 PROBLEM — M54.41 CHRONIC BILATERAL LOW BACK PAIN WITH BILATERAL SCIATICA: Status: ACTIVE | Noted: 2018-07-31

## 2018-07-31 PROBLEM — M47.816 LUMBAR SPONDYLOSIS: Status: ACTIVE | Noted: 2018-07-31

## 2018-07-31 PROBLEM — E66.9 OBESITY (BMI 30-39.9): Status: ACTIVE | Noted: 2018-07-31

## 2018-07-31 PROBLEM — G89.29 CHRONIC BILATERAL LOW BACK PAIN WITH BILATERAL SCIATICA: Status: ACTIVE | Noted: 2018-07-31

## 2018-07-31 PROBLEM — M51.369 DDD (DEGENERATIVE DISC DISEASE), LUMBAR: Status: ACTIVE | Noted: 2018-07-31

## 2018-07-31 PROBLEM — M54.42 CHRONIC BILATERAL LOW BACK PAIN WITH BILATERAL SCIATICA: Status: ACTIVE | Noted: 2018-07-31

## 2018-07-31 PROBLEM — M48.061 NEUROFORAMINAL STENOSIS OF LUMBAR SPINE: Status: ACTIVE | Noted: 2018-07-31

## 2018-07-31 PROBLEM — M51.36 DDD (DEGENERATIVE DISC DISEASE), LUMBAR: Status: ACTIVE | Noted: 2018-07-31

## 2018-07-31 NOTE — PROGRESS NOTES
[facility] New Patient Evaluation    Referred by: Nixon Bell MD  Reason for referral: M54.16 (ICD-10-CM) - Lumbar radiculopathy, right    CC:   Chief Complaint   Patient presents with    Low-back Pain     mostly to right side/radiating to right buttock     Last 3 PDI Scores 8/1/2018   Pain Disability Index (PDI) 33       HPI:   Tl Butts is a 43 y.o. male who complains of clbp on the right side radiating into the legs for more than 1 year.    Onset: 1+ years ago not associated with trauma, fall, or heavy lifting  Current Pain Score: 3/10  Typical Range: 3-10/10  Quality: Sharp, Stabbing  Radiation: from upper lumbar area to right side radiating into the right buttock  Worsened by: physical activty- lifting, bending; certain sitting or standing positions  Improved by: rest and sitting in certain positions    Previous Therapies:  PT/OT: Denies  HEP: Yes, but not in the past month due to pain (treadmill, light weight lifting)  Interventions: Denies  Surgery: Denies  Medications:   - NSAIDS: Ibuprofen, Aleve, Tylenol - provided no relief so stopped  - MSK Relaxants:   - TCAs:   - SNRIs:   - Topicals:   - Anticonvulsants:  - Opioids:     Current Pain Medications:  1. None     Review of Systems:  Review of Systems   Constitutional: Negative for chills and fever.   HENT: Negative for nosebleeds.    Eyes: Negative for pain.   Respiratory: Negative for hemoptysis.    Cardiovascular: Negative for chest pain.   Gastrointestinal: Negative for nausea and vomiting.   Genitourinary: Negative for dysuria.   Musculoskeletal: Positive for back pain and myalgias.   Skin: Negative for rash.   Neurological: Negative for tingling, tremors and focal weakness.   Endo/Heme/Allergies: Does not bruise/bleed easily.   Psychiatric/Behavioral: Negative for depression. The patient is not nervous/anxious and does not have insomnia.        History:    Current Outpatient Prescriptions:     fluticasone (FLONASE) 50  "mcg/actuation nasal spray, USE 1 SPRAY IN EACH NOSTRIL ONCE DAILY., Disp: 1 Bottle, Rfl: 6    Past Medical History:   Diagnosis Date    Chronic headaches     Elevated cholesterol     Hypertension     Lumbar spondylosis        Past Surgical History:   Procedure Laterality Date    KNEE SURGERY  age 16       Family History   Problem Relation Age of Onset    Hepatitis Mother     Diabetes Father     Diabetes Sister     Cancer Maternal Grandmother         lung    Cancer Maternal Grandfather         lung       Social History     Social History    Marital status:      Spouse name: N/A    Number of children: 3    Years of education: N/A     Occupational History    heavy equipment repair shop owner      Social History Main Topics    Smoking status: Never Smoker    Smokeless tobacco: Never Used    Alcohol use No    Drug use: Unknown    Sexual activity: Not on file     Other Topics Concern    Not on file     Social History Narrative    No narrative on file       Review of patient's allergies indicates:   Allergen Reactions    Penicillins      Other reaction(s): Unknown       Physical Exam:  Vitals:    08/01/18 0915   BP: (!) 176/102   Pulse: 70   Weight: 109.6 kg (241 lb 10 oz)   Height: 6' 1" (1.854 m)   PainSc:   5     General    Nursing note and vitals reviewed.  Constitutional: He is oriented to person, place, and time. He appears well-developed and well-nourished. No distress.   HENT:   Head: Normocephalic and atraumatic.   Nose: Nose normal.   Eyes: Conjunctivae and EOM are normal. Pupils are equal, round, and reactive to light. Right eye exhibits no discharge. Left eye exhibits no discharge. No scleral icterus.   Neck: No JVD present.   Cardiovascular: Intact distal pulses.    Pulmonary/Chest: Effort normal. No respiratory distress.   Abdominal: He exhibits no distension.   Neurological: He is alert and oriented to person, place, and time. Coordination normal.   Psychiatric: He has a normal " mood and affect. His behavior is normal. Judgment and thought content normal.     General Musculoskeletal Exam   Gait: normal     Back (L-Spine & T-Spine) / Neck (C-Spine) Exam     Tenderness Right paramedian tenderness of the Upper L-Spine. Left paramedian tenderness of the Upper L-Spine.     Back (L-Spine & T-Spine) Range of Motion   Extension: normal   Flexion: normal   Lateral Bend Right: normal   Lateral Bend Left: normal   Rotation Right: normal   Rotation Left: normal     Comments:  SLR negative bilaterally      Muscle Strength   Right Lower Extremity   Hip Abduction: 5/5   Hip Flexion: 5/5   Quadriceps:  5/5   Hamstrin/5   Anterior tibial:  5/5/5  Gastrocsoleus:  5/5/5  Left Lower Extremity   Hip Abduction: 5/5   Hip Flexion: 5/5   Quadriceps:  5/5   Hamstrin/5   Anterior tibial:  5/5/5   Gastrocsoleus:  5/5/5    Reflexes     Left Side  Quadriceps:  2+    Right Side   Quadriceps:  2+      Imaging:  MRI Lumbar Spine Without Contrast 2018  1. There is multilevel degenerative change discussed above.  There is no fracture.  There is only trace retrolisthesis of L5 on S1 which is likely physiologic.  There is multilevel facet joint arthropathy.  The pertinent findings are summarized below.  There is no spinal canal or significant foraminal stenosis at the L1-2, L2-3 levels.  There is no spinal stenosis at the T11-T12 level.  2. At the T12-L1 level, right-sided facet joint arthropathy with ligamentum flavum thickening narrows the right dorsal subarachnoid space and left could affect the right dorsal nerve root.  Correlate clinically.  Otherwise, there is no significant spinal canal or foraminal stenosis.  3. At the L3-4 level, there is mild right foraminal stenosis without spinal stenosis.  4. At the L4-5 level, there is mild-to-moderate right foraminal stenosis without spinal stenosis.  5. At the L5-S1 level, there is no significant spinal stenosis but there is crowding of the left lateral recess  and there is moderate left foraminal stenosis.      Labs:  BMP  Lab Results   Component Value Date     11/08/2017    K 4.5 11/08/2017     11/08/2017    CO2 27 11/08/2017    BUN 20 11/08/2017    CREATININE 1.0 11/08/2017    CALCIUM 9.6 11/08/2017    ANIONGAP 6 (L) 11/08/2017    ESTGFRAFRICA >60.0 11/08/2017    EGFRNONAA >60.0 11/08/2017     Lab Results   Component Value Date    ALT 30 11/08/2017    AST 20 11/08/2017    ALKPHOS 104 11/08/2017    BILITOT 0.6 11/08/2017       Assessment:  Problem List Items Addressed This Visit     Neuroforaminal stenosis of lumbar spine    Chronic bilateral low back pain without sciatica    Relevant Medications    tiZANidine (ZANAFLEX) 2 MG tablet    Other Relevant Orders    Ambulatory Referral to Physical/Occupational Therapy    Lumbar spondylosis    Relevant Orders    Ambulatory Referral to Physical/Occupational Therapy    DDD (degenerative disc disease), lumbar    Myofascial pain syndrome - Primary    Relevant Medications    tiZANidine (ZANAFLEX) 2 MG tablet    Other Relevant Orders    Ambulatory Referral to Physical/Occupational Therapy          This is a very pleasant 43-year-old male with a medical history significant for hypertension and obesity (recent 50 lb weight loss) who presents complaining of chronic, achy low back pain with insidious onset approximately 1 year ago.  The pain has been intermittent and is worsened by physical activity such as bending and lifting.  It is improved with rest and sitting with lumbar support.  He has not completed physical therapy and has failed over-the-counter medications such as ibuprofen, Aleve, and Tylenol.  His physical exam is consistent with muscle irritation and myofascial dysfunction in the upper portion of the lumbar spine lateral to the transverse processes.  While MRI shows degenerative disc disease with moderate neural foraminal stenosis at right L4-5 and left L5-S1, he does not describe any radicular symptoms with  pain radiating down into the legs or feet.  His pain radiates from the back into the right buttock and stops.  His symptoms and exam are most consistent with muscle dysfunction in the major muscle groups of the lower back such as the lumbar paraspinal muscles, quadratus lumborum muscles, and trapezius muscle.  I have recommended optimization of conservative therapy such as using heat/ice, physical therapy, home exercise focused on core strengthening and stretching, and a muscle relaxant for symptomatic relief.  He agrees and I will place the appropriate orders today.    : Not applicable    Treatment Plan:   Procedures: None recommended at this  PT/OT/HEP: Referral to PT placed for [facility] in [place], [place]  Medications: Start trial of Tizanidine 2-4 mg daily prn  Labs: reviewed and medications are appropriately dosed for current hepatorenal function.  Imaging: No additional recommended at this time.    Follow Up: RTC 8 weeks    Nancy Krueger Jr, MD  Interventional Pain Medicine / Anesthesiology    Disclaimer: This note was partly generated using dictation software which may occasionally result in transcription errors.

## 2018-08-01 ENCOUNTER — OFFICE VISIT (OUTPATIENT)
Dept: PAIN MEDICINE | Facility: CLINIC | Age: 43
End: 2018-08-01
Payer: COMMERCIAL

## 2018-08-01 VITALS
WEIGHT: 241.63 LBS | DIASTOLIC BLOOD PRESSURE: 102 MMHG | HEART RATE: 70 BPM | BODY MASS INDEX: 32.02 KG/M2 | SYSTOLIC BLOOD PRESSURE: 176 MMHG | HEIGHT: 73 IN

## 2018-08-01 DIAGNOSIS — M51.36 DDD (DEGENERATIVE DISC DISEASE), LUMBAR: ICD-10-CM

## 2018-08-01 DIAGNOSIS — M47.816 LUMBAR SPONDYLOSIS: ICD-10-CM

## 2018-08-01 DIAGNOSIS — G89.29 CHRONIC BILATERAL LOW BACK PAIN WITHOUT SCIATICA: ICD-10-CM

## 2018-08-01 DIAGNOSIS — M48.061 NEUROFORAMINAL STENOSIS OF LUMBAR SPINE: ICD-10-CM

## 2018-08-01 DIAGNOSIS — M54.50 CHRONIC BILATERAL LOW BACK PAIN WITHOUT SCIATICA: ICD-10-CM

## 2018-08-01 DIAGNOSIS — M79.18 MYOFASCIAL PAIN SYNDROME: Primary | ICD-10-CM

## 2018-08-01 PROCEDURE — 99999 PR PBB SHADOW E&M-EST. PATIENT-LVL III: CPT | Mod: PBBFAC,,, | Performed by: PAIN MEDICINE

## 2018-08-01 PROCEDURE — 99244 OFF/OP CNSLTJ NEW/EST MOD 40: CPT | Mod: S$GLB,,, | Performed by: PAIN MEDICINE

## 2018-08-01 RX ORDER — TIZANIDINE 2 MG/1
2-4 TABLET ORAL EVERY 8 HOURS PRN
Qty: 30 TABLET | Refills: 1 | Status: SHIPPED | OUTPATIENT
Start: 2018-08-01 | End: 2020-01-15 | Stop reason: SDUPTHER

## 2018-08-01 NOTE — LETTER
August 1, 2018      Nixon Bell MD  [address] [facility] [address] [place] [address]           [facility]  [address] [facility] [address] [place] [address]  Phone: [phone]  Fax: [phone]          Patient: Tl Butts   MR Number: [id number]   YOB: 1975   Date of Visit: 8/1/2018       Dear Dr. Nixon Bell:    Thank you for referring Tl Butts to me for evaluation. Attached you will find relevant portions of my assessment and plan of care.    If you have questions, please do not hesitate to call me. I look forward to following Tl Butts along with you.    Sincerely,    Nancy Krueger Jr., MD    Enclosure  CC:  No Recipients    If you would like to receive this communication electronically, please contact [email or web address] or [phone] to request more information on [employer] Link access.    For providers and/or their staff who would like to refer a patient to [facility], please contact us through our one-stop-shop provider referral line, [facility], at [phone].    If you feel you have received this communication in error or would no longer like to receive these types of communications, please e-mail [email or web address]

## 2018-08-10 ENCOUNTER — OFFICE VISIT (OUTPATIENT)
Dept: FAMILY MEDICINE | Facility: CLINIC | Age: 43
End: 2018-08-10
Payer: COMMERCIAL

## 2018-08-10 VITALS
HEART RATE: 75 BPM | RESPIRATION RATE: 18 BRPM | DIASTOLIC BLOOD PRESSURE: 78 MMHG | OXYGEN SATURATION: 96 % | BODY MASS INDEX: 32.06 KG/M2 | WEIGHT: 241.88 LBS | SYSTOLIC BLOOD PRESSURE: 120 MMHG | HEIGHT: 73 IN

## 2018-08-10 DIAGNOSIS — I10 HYPERTENSION, UNSPECIFIED TYPE: Primary | ICD-10-CM

## 2018-08-10 DIAGNOSIS — G44.209 TENSION HEADACHE: ICD-10-CM

## 2018-08-10 PROCEDURE — 3078F DIAST BP <80 MM HG: CPT | Mod: CPTII,S$GLB,, | Performed by: FAMILY MEDICINE

## 2018-08-10 PROCEDURE — 3008F BODY MASS INDEX DOCD: CPT | Mod: CPTII,S$GLB,, | Performed by: FAMILY MEDICINE

## 2018-08-10 PROCEDURE — 99999 PR PBB SHADOW E&M-EST. PATIENT-LVL III: CPT | Mod: PBBFAC,,, | Performed by: FAMILY MEDICINE

## 2018-08-10 PROCEDURE — 99213 OFFICE O/P EST LOW 20 MIN: CPT | Mod: S$GLB,,, | Performed by: FAMILY MEDICINE

## 2018-08-10 PROCEDURE — 3074F SYST BP LT 130 MM HG: CPT | Mod: CPTII,S$GLB,, | Performed by: FAMILY MEDICINE

## 2018-08-10 RX ORDER — LISINOPRIL 10 MG/1
TABLET ORAL
Refills: 11 | COMMUNITY
Start: 2018-08-01 | End: 2018-08-10 | Stop reason: SDUPTHER

## 2018-08-10 RX ORDER — LISINOPRIL 10 MG/1
10 TABLET ORAL DAILY
Qty: 30 TABLET | Refills: 5 | Status: SHIPPED | OUTPATIENT
Start: 2018-08-10 | End: 2019-09-10 | Stop reason: SDUPTHER

## 2018-08-10 NOTE — PROGRESS NOTES
Subjective:       Patient ID: Tl Butts is a 43 y.o. male.    Chief Complaint: Hypertension and Headaches    HPI Comments:   Here for f/u on chronic health issues.  BP was 170/100 at recent pain management appointment.  He resumed lisinopril 10mg after that and BP has normalized.  Going to [facility] for low back pain. He had first visit.  He has Zanaflex to use as needed.  HTN - he stopped Lisinopril 5mg daily 2 months ago due to persistent faintness. Home BP has been normal.  HLD - following low-fat diet  Chronic headaches - gets HA 5 times a month; uses Advil PRN. He has been having a regular HA for the last 2 weeks.  This is posterior in the neck and down radiate to the forehead.  7/10 at worse. 'pressure' This feels similar to previous HA in the past. No associated visual changes or dizziness. Using Exedrin tension with some relief.    Past Medical History:   Diagnosis Date    Chronic headaches     Elevated cholesterol     Hypertension     Lumbar spondylosis        Past Surgical History:   Procedure Laterality Date    KNEE SURGERY  age 16       Allergies   Allergen Reactions    Penicillins      Other reaction(s): Unknown       Social History     Social History    Marital status:      Spouse name: N/A    Number of children: 3    Years of education: N/A     Occupational History    heavy equipment repair shop owner      Social History Main Topics    Smoking status: Never Smoker    Smokeless tobacco: Never Used    Alcohol use No    Drug use: Unknown    Sexual activity: Not on file     Other Topics Concern    Not on file     Social History Narrative    No narrative on file       Current Outpatient Prescriptions on File Prior to Visit   Medication Sig Dispense Refill    fluticasone (FLONASE) 50 mcg/actuation nasal spray USE 1 SPRAY IN EACH NOSTRIL ONCE DAILY. 1 Bottle 6    tiZANidine (ZANAFLEX) 2 MG tablet Take 1-2 tablets (2-4 mg total) by mouth every 8 (eight) hours as needed. 30 tablet  "1     No current facility-administered medications on file prior to visit.        Family History   Problem Relation Age of Onset    Hepatitis Mother     Diabetes Father     Diabetes Sister     Cancer Maternal Grandmother         lung    Cancer Maternal Grandfather         lung       Hypertension  Associated symptoms include headaches and palpitations. Pertinent negatives include no chest pain, neck pain or shortness of breath.     Review of Systems   Constitutional: Negative for fever, appetite change, fatigue and unexpected weight change.   HENT: Negative for ear pain, nosebleeds, congestion, sore throat, rhinorrhea, trouble swallowing, neck pain, postnasal drip, sinus pressure and ear discharge.    Eyes: Negative for pain, discharge, redness and visual disturbance.   Respiratory: Negative for apnea, cough, chest tightness, shortness of breath and wheezing.    Cardiovascular: Negative for chest pain and leg swelling.   Gastrointestinal: Negative for nausea, vomiting, abdominal pain, diarrhea, constipation, blood in stool and abdominal distention.   Musculoskeletal: Negative for myalgias, back pain, joint swelling and gait problem.   Skin: Negative for color change, rash and wound.   Neurological: Negative for dizziness, tremors, seizures, syncope, speech difficulty, weakness, light-headedness and numbness. Regular headaches  Hematological: Negative for adenopathy. Does not bruise/bleed easily.   Psychiatric/Behavioral: Negative for suicidal ideas, hallucinations, behavioral problems and confusion.       Objective:      /78   Pulse 75   Resp 18   Ht 6' 1" (1.854 m)   Wt 109.7 kg (241 lb 13.5 oz)   SpO2 96%   BMI 31.91 kg/m²     Physical Exam   Constitutional: He is oriented to person, place, and time. He appears well-developed and well-nourished. He is active and cooperative.   HENT:   Head: Normocephalic and atraumatic.   Nose: Nose normal.   Mouth/Throat: Oropharynx is clear and moist. No " oropharyngeal exudate.   Eyes: Conjunctivae and EOM are normal. Pupils are equal, round, and reactive to light. Right eye exhibits no discharge. Left eye exhibits no discharge. No scleral icterus.   Neck: Trachea normal, normal range of motion and full passive range of motion without pain. Neck supple. Normal carotid pulses and no JVD present. Carotid bruit is not present. No tracheal deviation present. No mass and no thyromegaly present.   Cardiovascular: Normal rate, regular rhythm, S1 normal, S2 normal, normal heart sounds and intact distal pulses.  Exam reveals no gallop and no friction rub.    No murmur heard.  Pulmonary/Chest: Effort normal and breath sounds normal. No respiratory distress. He has no wheezes. He has no rales.   Abdominal: Soft. Normal appearance and bowel sounds are normal. He exhibits no distension and no mass. There is no hepatosplenomegaly. There is no tenderness. There is no rebound and no guarding. reducible umbilical hernia.  Neurological: He is alert and oriented to person, place, and time. He has normal strength. No cranial nerve deficit. Coordination normal.   Skin: Skin is warm, dry and intact. No rash noted. No cyanosis or erythema. No pallor. Nails show no clubbing.   Psychiatric: He has a normal mood and affect. His speech is normal and behavior is normal. Judgment and thought content normal.       Results for orders placed or performed in visit on 11/08/17   Comprehensive metabolic panel   Result Value Ref Range    Sodium 140 136 - 145 mmol/L    Potassium 4.5 3.5 - 5.1 mmol/L    Chloride 107 95 - 110 mmol/L    CO2 27 23 - 29 mmol/L    Glucose 99 70 - 110 mg/dL    BUN, Bld 20 6 - 20 mg/dL    Creatinine 1.0 0.5 - 1.4 mg/dL    Calcium 9.6 8.7 - 10.5 mg/dL    Total Protein 7.1 6.0 - 8.4 g/dL    Albumin 4.0 3.5 - 5.2 g/dL    Total Bilirubin 0.6 0.1 - 1.0 mg/dL    Alkaline Phosphatase 104 55 - 135 U/L    AST 20 10 - 40 U/L    ALT 30 10 - 44 U/L    Anion Gap 6 (L) 8 - 16 mmol/L    eGFR  if African American >60.0 >60 mL/min/1.73 m^2    eGFR if non African American >60.0 >60 mL/min/1.73 m^2   Hemoglobin A1c   Result Value Ref Range    Hemoglobin A1C 5.3 4.0 - 5.6 %    Estimated Avg Glucose 105 68 - 131 mg/dL   Lipid panel   Result Value Ref Range    Cholesterol 142 120 - 199 mg/dL    Triglycerides 117 30 - 150 mg/dL    HDL 31 (L) 40 - 75 mg/dL    LDL Cholesterol 87.6 63.0 - 159.0 mg/dL    HDL/Chol Ratio 21.8 20.0 - 50.0 %    Total Cholesterol/HDL Ratio 4.6 2.0 - 5.0    Non-HDL Cholesterol 111 mg/dL     Assessment:       1. Hypertension, unspecified type    2. Tension headache        Plan:       Hypertension, unspecified type  -     lisinopril 10 MG tablet; Take 1 tablet (10 mg total) by mouth once daily.  Dispense: 30 tablet; Refill: 5    Tension headache  -     lisinopril 10 MG tablet; Take 1 tablet (10 mg total) by mouth once daily.  Dispense: 30 tablet; Refill: 5    continue lisinopril 10mg daily  Continue Excedrin PRN HA; discussd basic neck stretching and proper posture  Continue weight loss efforts  Goal weight 225  Counseled on regular exercise, maintenance of a healthy weight, balanced diet rich in fruits/vegetables and lean protein, and avoidance of unhealthy habits like smoking and excessive alcohol intake.  F/u for physical with labs as planned

## 2018-08-15 DIAGNOSIS — R09.81 NASAL CONGESTION: ICD-10-CM

## 2018-08-15 DIAGNOSIS — J34.2 DEFLECTED NASAL SEPTUM: ICD-10-CM

## 2018-08-15 RX ORDER — FLUTICASONE PROPIONATE 50 MCG
SPRAY, SUSPENSION (ML) NASAL
Qty: 16 ML | Refills: 0 | Status: SHIPPED | OUTPATIENT
Start: 2018-08-15 | End: 2020-07-14

## 2018-09-10 DIAGNOSIS — I10 ESSENTIAL HYPERTENSION: ICD-10-CM

## 2018-09-10 RX ORDER — LISINOPRIL 10 MG/1
TABLET ORAL
Qty: 30 TABLET | Refills: 11 | Status: SHIPPED | OUTPATIENT
Start: 2018-09-10 | End: 2019-09-10 | Stop reason: SDUPTHER

## 2018-10-31 ENCOUNTER — PES CALL (OUTPATIENT)
Dept: ADMINISTRATIVE | Facility: CLINIC | Age: 43
End: 2018-10-31

## 2018-11-01 ENCOUNTER — PES CALL (OUTPATIENT)
Dept: ADMINISTRATIVE | Facility: CLINIC | Age: 43
End: 2018-11-01

## 2018-11-05 ENCOUNTER — PES CALL (OUTPATIENT)
Dept: ADMINISTRATIVE | Facility: CLINIC | Age: 43
End: 2018-11-05

## 2019-08-20 ENCOUNTER — PATIENT OUTREACH (OUTPATIENT)
Dept: ADMINISTRATIVE | Facility: HOSPITAL | Age: 44
End: 2019-08-20

## 2019-09-10 DIAGNOSIS — I10 ESSENTIAL HYPERTENSION: ICD-10-CM

## 2019-09-10 RX ORDER — LISINOPRIL 10 MG/1
TABLET ORAL
Qty: 90 TABLET | Refills: 0 | Status: SHIPPED | OUTPATIENT
Start: 2019-09-10 | End: 2019-10-14 | Stop reason: SDUPTHER

## 2019-09-10 NOTE — PROGRESS NOTES
Refill Authorization Note     is requesting a refill authorization.    Brief assessment and rationale for refill: APPROVE: Needs Labs   Name and strength of medication: lisinopril 10 MG tablet  Medication-related problems identified: Requires labs    Medication Therapy Plan: HTN-lco(LOV); BP-controlled NTBO(CMP/LIPID); Approve 3 more months     Medication reconciliation completed: No              How patient will take medication: 1t po qd           Comments:   Blood Pressure Readings: <139/89mmHg 12 months   BP Readings from Last 3 Encounters:   08/10/18 120/78   08/01/18 (!) 176/102   05/22/18 (!) 120/90         Last Labs: 6 months: Diuretics-(Cr/K/Na)  or 12 months: non-diuretics (Cr/K)   Lab Results   Component Value Date    CREATININE 1.0 11/08/2017    CREATININE 1.1 08/07/2017    CREATININE 1.0 06/01/2016       Lab Results   Component Value Date    K 4.5 11/08/2017    K 4.6 08/07/2017    K 5.0 06/01/2016    Lab Results   Component Value Date     11/08/2017     08/07/2017     06/01/2016        Digital Hypertension Data: values will display if enrolled   Last 5 Patient Entered Readings                                      Current 30 Day Average:      There is no flowsheet data to display.           Appointments past 12m or future 3m    Date Provider   LAST VISIT  8/10/2018 Nixon Bell MD   NEXT VISIT  10/14/2019 Nixon Bell MD

## 2019-10-11 ENCOUNTER — LAB VISIT (OUTPATIENT)
Dept: LAB | Facility: HOSPITAL | Age: 44
End: 2019-10-11
Attending: FAMILY MEDICINE
Payer: COMMERCIAL

## 2019-10-11 DIAGNOSIS — I10 ESSENTIAL HYPERTENSION: ICD-10-CM

## 2019-10-11 LAB
ALBUMIN SERPL BCP-MCNC: 4.2 G/DL (ref 3.5–5.2)
ALP SERPL-CCNC: 88 U/L (ref 55–135)
ALT SERPL W/O P-5'-P-CCNC: 26 U/L (ref 10–44)
ANION GAP SERPL CALC-SCNC: 8 MMOL/L (ref 8–16)
AST SERPL-CCNC: 19 U/L (ref 10–40)
BILIRUB SERPL-MCNC: 0.3 MG/DL (ref 0.1–1)
BUN SERPL-MCNC: 18 MG/DL (ref 6–20)
CALCIUM SERPL-MCNC: 8.9 MG/DL (ref 8.7–10.5)
CHLORIDE SERPL-SCNC: 108 MMOL/L (ref 95–110)
CHOLEST SERPL-MCNC: 149 MG/DL (ref 120–199)
CHOLEST/HDLC SERPL: 4.4 {RATIO} (ref 2–5)
CO2 SERPL-SCNC: 23 MMOL/L (ref 23–29)
CREAT SERPL-MCNC: 0.9 MG/DL (ref 0.5–1.4)
EST. GFR  (AFRICAN AMERICAN): >60 ML/MIN/1.73 M^2
EST. GFR  (NON AFRICAN AMERICAN): >60 ML/MIN/1.73 M^2
GLUCOSE SERPL-MCNC: 90 MG/DL (ref 70–110)
HDLC SERPL-MCNC: 34 MG/DL (ref 40–75)
HDLC SERPL: 22.8 % (ref 20–50)
LDLC SERPL CALC-MCNC: 88.6 MG/DL (ref 63–159)
NONHDLC SERPL-MCNC: 115 MG/DL
POTASSIUM SERPL-SCNC: 4.2 MMOL/L (ref 3.5–5.1)
PROT SERPL-MCNC: 7.1 G/DL (ref 6–8.4)
SODIUM SERPL-SCNC: 139 MMOL/L (ref 136–145)
TRIGL SERPL-MCNC: 132 MG/DL (ref 30–150)

## 2019-10-11 PROCEDURE — 80053 COMPREHEN METABOLIC PANEL: CPT

## 2019-10-11 PROCEDURE — 80061 LIPID PANEL: CPT

## 2019-10-11 PROCEDURE — 36415 COLL VENOUS BLD VENIPUNCTURE: CPT | Mod: PO

## 2019-10-14 ENCOUNTER — OFFICE VISIT (OUTPATIENT)
Dept: FAMILY MEDICINE | Facility: CLINIC | Age: 44
End: 2019-10-14
Payer: COMMERCIAL

## 2019-10-14 VITALS
OXYGEN SATURATION: 98 % | HEIGHT: 73 IN | SYSTOLIC BLOOD PRESSURE: 100 MMHG | WEIGHT: 267.63 LBS | BODY MASS INDEX: 35.47 KG/M2 | DIASTOLIC BLOOD PRESSURE: 70 MMHG | TEMPERATURE: 98 F | HEART RATE: 69 BPM

## 2019-10-14 DIAGNOSIS — Z00.00 PREVENTATIVE HEALTH CARE: Primary | ICD-10-CM

## 2019-10-14 DIAGNOSIS — I10 HYPERTENSION, UNSPECIFIED TYPE: ICD-10-CM

## 2019-10-14 DIAGNOSIS — I10 ESSENTIAL HYPERTENSION: ICD-10-CM

## 2019-10-14 PROCEDURE — 99396 PR PREVENTIVE VISIT,EST,40-64: ICD-10-PCS | Mod: S$GLB,,, | Performed by: FAMILY MEDICINE

## 2019-10-14 PROCEDURE — 3078F PR MOST RECENT DIASTOLIC BLOOD PRESSURE < 80 MM HG: ICD-10-PCS | Mod: CPTII,S$GLB,, | Performed by: FAMILY MEDICINE

## 2019-10-14 PROCEDURE — 3074F SYST BP LT 130 MM HG: CPT | Mod: CPTII,S$GLB,, | Performed by: FAMILY MEDICINE

## 2019-10-14 PROCEDURE — 99999 PR PBB SHADOW E&M-EST. PATIENT-LVL III: ICD-10-PCS | Mod: PBBFAC,,, | Performed by: FAMILY MEDICINE

## 2019-10-14 PROCEDURE — 3078F DIAST BP <80 MM HG: CPT | Mod: CPTII,S$GLB,, | Performed by: FAMILY MEDICINE

## 2019-10-14 PROCEDURE — 99999 PR PBB SHADOW E&M-EST. PATIENT-LVL III: CPT | Mod: PBBFAC,,, | Performed by: FAMILY MEDICINE

## 2019-10-14 PROCEDURE — 3074F PR MOST RECENT SYSTOLIC BLOOD PRESSURE < 130 MM HG: ICD-10-PCS | Mod: CPTII,S$GLB,, | Performed by: FAMILY MEDICINE

## 2019-10-14 PROCEDURE — 99396 PREV VISIT EST AGE 40-64: CPT | Mod: S$GLB,,, | Performed by: FAMILY MEDICINE

## 2019-10-14 RX ORDER — LISINOPRIL 10 MG/1
10 TABLET ORAL DAILY
Qty: 90 TABLET | Refills: 3 | Status: SHIPPED | OUTPATIENT
Start: 2019-10-14 | End: 2021-01-12 | Stop reason: SDUPTHER

## 2019-10-14 RX ORDER — PHENTERMINE HYDROCHLORIDE 37.5 MG/1
37.5 TABLET ORAL
Qty: 30 TABLET | Refills: 2 | Status: SHIPPED | OUTPATIENT
Start: 2019-10-14 | End: 2019-11-13

## 2019-10-14 NOTE — PROGRESS NOTES
Subjective:       Patient ID: Tl Butts is a 44 y.o. male.    Chief Complaint: Annual Exam    HPI Comments:   Here for annual exam and to f/u on chronic health issues.    HTN - tolerating Lisinopril 10 mg daily  HLD - following low-fat diet  Chronic headaches - gets HA 5 times a month; uses Advil PRN.   HA is in posterior neck and radiates to the forehead.  7/10 at worse. 'pressure' Using Exedrin tension with some relief.    Requesting some assistance with weight loss.    Past Medical History:   Diagnosis Date    Chronic headaches     Elevated cholesterol     Hypertension     Lumbar spondylosis        Past Surgical History:   Procedure Laterality Date    KNEE SURGERY  age 16       Allergies   Allergen Reactions    Penicillins      Other reaction(s): Unknown       Social History     Socioeconomic History    Marital status:      Spouse name: Not on file    Number of children: 3    Years of education: Not on file    Highest education level: Not on file   Occupational History    Occupation: heavy equipment repair shop owner   Social Needs    Financial resource strain: Not on file    Food insecurity:     Worry: Not on file     Inability: Not on file    Transportation needs:     Medical: Not on file     Non-medical: Not on file   Tobacco Use    Smoking status: Never Smoker    Smokeless tobacco: Never Used   Substance and Sexual Activity    Alcohol use: No    Drug use: Not on file    Sexual activity: Not on file   Lifestyle    Physical activity:     Days per week: Not on file     Minutes per session: Not on file    Stress: Not on file   Relationships    Social connections:     Talks on phone: Not on file     Gets together: Not on file     Attends Yarsani service: Not on file     Active member of club or organization: Not on file     Attends meetings of clubs or organizations: Not on file     Relationship status: Not on file   Other Topics Concern    Not on file   Social History Narrative     Not on file       Current Outpatient Medications on File Prior to Visit   Medication Sig Dispense Refill    fluticasone (FLONASE) 50 mcg/actuation nasal spray SHAKE LIQUID AND USE 1 SPRAY IN EACH NOSTRIL EVERY DAY 16 mL 0    tiZANidine (ZANAFLEX) 2 MG tablet Take 1-2 tablets (2-4 mg total) by mouth every 8 (eight) hours as needed. 30 tablet 1    [DISCONTINUED] lisinopril 10 MG tablet TAKE 1 TABLET(10 MG) BY MOUTH EVERY DAY 90 tablet 0     No current facility-administered medications on file prior to visit.        Family History   Problem Relation Age of Onset    Hepatitis Mother     Diabetes Father     Diabetes Sister     Cancer Maternal Grandmother         lung    Cancer Maternal Grandfather         lung       Hypertension  Associated symptoms include headaches and palpitations. Pertinent negatives include no chest pain, neck pain or shortness of breath.     Review of Systems   Constitutional: Negative for fever, appetite change, fatigue and unexpected weight change.   HENT: Negative for ear pain, nosebleeds, congestion, sore throat, rhinorrhea, trouble swallowing, neck pain, postnasal drip, sinus pressure and ear discharge.    Eyes: Negative for pain, discharge, redness and visual disturbance.   Respiratory: Negative for apnea, cough, chest tightness, shortness of breath and wheezing.    Cardiovascular: Negative for chest pain and leg swelling.   Gastrointestinal: Negative for nausea, vomiting, abdominal pain, diarrhea, constipation, blood in stool and abdominal distention.   Musculoskeletal: Negative for myalgias, joint swelling and gait problem. + back pain  Skin: Negative for color change, rash and wound.   Neurological: Negative for dizziness, tremors, seizures, syncope, speech difficulty, weakness, light-headedness and numbness. Regular headaches  Hematological: Negative for adenopathy. Does not bruise/bleed easily.   Psychiatric/Behavioral: Negative for suicidal ideas, hallucinations, behavioral  "problems and confusion.       Objective:      /70 (BP Location: Left arm, Patient Position: Sitting)   Pulse 69   Temp 98.3 °F (36.8 °C) (Oral)   Ht 6' 1" (1.854 m)   Wt 121.4 kg (267 lb 10.2 oz)   SpO2 98%   BMI 35.31 kg/m²     Physical Exam   Constitutional: He is oriented to person, place, and time. He appears well-developed and well-nourished. He is active and cooperative.   HENT:   Head: Normocephalic and atraumatic.   Nose: Nose normal.   Mouth/Throat: Oropharynx is clear and moist. No oropharyngeal exudate.   Eyes: Conjunctivae and EOM are normal. Pupils are equal, round, and reactive to light. Right eye exhibits no discharge. Left eye exhibits no discharge. No scleral icterus.   Neck: Trachea normal, normal range of motion and full passive range of motion without pain. Neck supple. Normal carotid pulses and no JVD present. Carotid bruit is not present. No tracheal deviation present. No mass and no thyromegaly present.   Cardiovascular: Normal rate, regular rhythm, S1 normal, S2 normal, normal heart sounds and intact distal pulses.  Exam reveals no gallop and no friction rub.    No murmur heard.  Pulmonary/Chest: Effort normal and breath sounds normal. No respiratory distress. He has no wheezes. He has no rales.   Abdominal: Soft. Normal appearance and bowel sounds are normal. He exhibits no distension and no mass. There is no hepatosplenomegaly. There is no tenderness. There is no rebound and no guarding. reducible umbilical hernia.  Neurological: He is alert and oriented to person, place, and time. He has normal strength. No cranial nerve deficit. Coordination normal.   Skin: Skin is warm, dry and intact. No rash noted. No cyanosis or erythema. No pallor. Nails show no clubbing.   Psychiatric: He has a normal mood and affect. His speech is normal and behavior is normal. Judgment and thought content normal.       Results for orders placed or performed in visit on 10/11/19   Comprehensive " metabolic panel   Result Value Ref Range    Sodium 139 136 - 145 mmol/L    Potassium 4.2 3.5 - 5.1 mmol/L    Chloride 108 95 - 110 mmol/L    CO2 23 23 - 29 mmol/L    Glucose 90 70 - 110 mg/dL    BUN, Bld 18 6 - 20 mg/dL    Creatinine 0.9 0.5 - 1.4 mg/dL    Calcium 8.9 8.7 - 10.5 mg/dL    Total Protein 7.1 6.0 - 8.4 g/dL    Albumin 4.2 3.5 - 5.2 g/dL    Total Bilirubin 0.3 0.1 - 1.0 mg/dL    Alkaline Phosphatase 88 55 - 135 U/L    AST 19 10 - 40 U/L    ALT 26 10 - 44 U/L    Anion Gap 8 8 - 16 mmol/L    eGFR if African American >60.0 >60 mL/min/1.73 m^2    eGFR if non African American >60.0 >60 mL/min/1.73 m^2   Lipid panel   Result Value Ref Range    Cholesterol 149 120 - 199 mg/dL    Triglycerides 132 30 - 150 mg/dL    HDL 34 (L) 40 - 75 mg/dL    LDL Cholesterol 88.6 63.0 - 159.0 mg/dL    Hdl/Cholesterol Ratio 22.8 20.0 - 50.0 %    Total Cholesterol/HDL Ratio 4.4 2.0 - 5.0    Non-HDL Cholesterol 115 mg/dL     Assessment:       1. Preventative health care    2. Hypertension, unspecified type    3. Essential hypertension        Plan:       Preventative health care    Hypertension, unspecified type    Essential hypertension  -     lisinopril 10 MG tablet; Take 1 tablet (10 mg total) by mouth once daily.  Dispense: 90 tablet; Refill: 3    Other orders  -     phentermine (ADIPEX-P) 37.5 mg tablet; Take 1 tablet (37.5 mg total) by mouth before breakfast.  Dispense: 30 tablet; Refill: 2       continue lisinopril 10mg daily  Continue weight loss efforts  Goal weight 225  Counseled on regular exercise, maintenance of a healthy weight, balanced diet rich in fruits/vegetables and lean protein, and avoidance of unhealthy habits like smoking and excessive alcohol intake.  F/u for physical with labs as planned

## 2020-01-15 DIAGNOSIS — M54.50 CHRONIC BILATERAL LOW BACK PAIN WITHOUT SCIATICA: ICD-10-CM

## 2020-01-15 DIAGNOSIS — G89.29 CHRONIC BILATERAL LOW BACK PAIN WITHOUT SCIATICA: ICD-10-CM

## 2020-01-15 DIAGNOSIS — M79.18 MYOFASCIAL PAIN SYNDROME: ICD-10-CM

## 2020-01-15 RX ORDER — TIZANIDINE 2 MG/1
2-4 TABLET ORAL EVERY 8 HOURS PRN
Qty: 30 TABLET | Refills: 1 | Status: SHIPPED | OUTPATIENT
Start: 2020-01-15 | End: 2020-07-14

## 2020-01-15 NOTE — TELEPHONE ENCOUNTER
----- Message from Onofre Colmenares sent at 1/15/2020 10:26 AM CST -----  Contact: pt wife Fanny  Type:  RX Refill Request    Who Called:  Fanny  Refill or New Rx:  refill  RX Name and Strength:  tiZANidine (ZANAFLEX) 2 MG tablet  How is the patient currently taking it? (ex. 1XDay):  1 x day  Is this a 30 day or 90 day RX:  90  Preferred Pharmacy with phone number:    [facility] #[id number] - [facility] [address] [place] [address] AT [facility] [address] [facility]  [address] [place] [id number]  Phone: [phone] Fax: [phone]    Local or Mail Order:    Ordering Provider:    Zhou Call Back Number:  [phone]  Additional Information:  Pt has 3 more left

## 2020-01-17 RX ORDER — PHENTERMINE HYDROCHLORIDE 37.5 MG/1
37.5 TABLET ORAL
Qty: 30 TABLET | Refills: 2 | Status: SHIPPED | OUTPATIENT
Start: 2020-01-17 | End: 2020-02-16

## 2020-01-17 NOTE — TELEPHONE ENCOUNTER
----- Message from Hellen Wick sent at 1/17/2020  8:45 AM CST -----  Type: Needs Medical Advice    Who Called:  Wife - Fanny Butts  Symptoms (please be specific):  na  How long has patient had these symptoms:  na  Pharmacy name and phone #:    [facility] #[id number] - [facility] [address] [facility] [address] [facility] [address]  [id number] [place] [address]  Phone: [phone] Fax: [phone]  Best Call Back Number: [phone]  Additional Information: the wrong prescription was sent to pharmacy/should have been for Phentermine (wife does not know the dosage)/please call this prescription to pharmacy and advise wife when sent

## 2020-05-05 ENCOUNTER — PATIENT MESSAGE (OUTPATIENT)
Dept: ADMINISTRATIVE | Facility: HOSPITAL | Age: 45
End: 2020-05-05

## 2020-07-14 ENCOUNTER — OFFICE VISIT (OUTPATIENT)
Dept: FAMILY MEDICINE | Facility: CLINIC | Age: 45
End: 2020-07-14
Payer: COMMERCIAL

## 2020-07-14 VITALS
HEIGHT: 73 IN | BODY MASS INDEX: 32.31 KG/M2 | OXYGEN SATURATION: 98 % | HEART RATE: 62 BPM | SYSTOLIC BLOOD PRESSURE: 134 MMHG | WEIGHT: 243.81 LBS | TEMPERATURE: 99 F | DIASTOLIC BLOOD PRESSURE: 94 MMHG

## 2020-07-14 DIAGNOSIS — I10 HYPERTENSION, UNSPECIFIED TYPE: ICD-10-CM

## 2020-07-14 DIAGNOSIS — M54.9 UPPER BACK PAIN: ICD-10-CM

## 2020-07-14 DIAGNOSIS — K42.9 UMBILICAL HERNIA WITHOUT OBSTRUCTION AND WITHOUT GANGRENE: Primary | ICD-10-CM

## 2020-07-14 PROCEDURE — 3075F PR MOST RECENT SYSTOLIC BLOOD PRESS GE 130-139MM HG: ICD-10-PCS | Mod: CPTII,S$GLB,, | Performed by: FAMILY MEDICINE

## 2020-07-14 PROCEDURE — 3080F PR MOST RECENT DIASTOLIC BLOOD PRESSURE >= 90 MM HG: ICD-10-PCS | Mod: CPTII,S$GLB,, | Performed by: FAMILY MEDICINE

## 2020-07-14 PROCEDURE — 99999 PR PBB SHADOW E&M-EST. PATIENT-LVL III: CPT | Mod: PBBFAC,,, | Performed by: FAMILY MEDICINE

## 2020-07-14 PROCEDURE — 3075F SYST BP GE 130 - 139MM HG: CPT | Mod: CPTII,S$GLB,, | Performed by: FAMILY MEDICINE

## 2020-07-14 PROCEDURE — 99999 PR PBB SHADOW E&M-EST. PATIENT-LVL III: ICD-10-PCS | Mod: PBBFAC,,, | Performed by: FAMILY MEDICINE

## 2020-07-14 PROCEDURE — 3008F PR BODY MASS INDEX (BMI) DOCUMENTED: ICD-10-PCS | Mod: CPTII,S$GLB,, | Performed by: FAMILY MEDICINE

## 2020-07-14 PROCEDURE — 99213 PR OFFICE/OUTPT VISIT, EST, LEVL III, 20-29 MIN: ICD-10-PCS | Mod: S$GLB,,, | Performed by: FAMILY MEDICINE

## 2020-07-14 PROCEDURE — 3080F DIAST BP >= 90 MM HG: CPT | Mod: CPTII,S$GLB,, | Performed by: FAMILY MEDICINE

## 2020-07-14 PROCEDURE — 99213 OFFICE O/P EST LOW 20 MIN: CPT | Mod: S$GLB,,, | Performed by: FAMILY MEDICINE

## 2020-07-14 PROCEDURE — 3008F BODY MASS INDEX DOCD: CPT | Mod: CPTII,S$GLB,, | Performed by: FAMILY MEDICINE

## 2020-07-14 NOTE — PROGRESS NOTES
Subjective:       Patient ID: Tl Butts is a 45 y.o. male.    Chief Complaint: Hernia (Next to Belly Button)    Umbilical hernia - 1 month ago it became tender after a large meal. Since then it has been intermittently tender; it is reducible.  Not taking any meds for this.    HTN - taking lisinopril 10mg 1/2 tab daily; he cut this back when feeling lightheaded.    C/o some chronic and persistent right upper back pain  For the last 2 years.  Pain is dull, but does get worse with acitivity      Past Medical History:   Diagnosis Date    Chronic headaches     Elevated cholesterol     Hypertension     Lumbar spondylosis        Past Surgical History:   Procedure Laterality Date    KNEE SURGERY  age 16       Review of patient's allergies indicates:   Allergen Reactions    Penicillins      Other reaction(s): Unknown       Social History     Socioeconomic History    Marital status:      Spouse name: Not on file    Number of children: 3    Years of education: Not on file    Highest education level: Not on file   Occupational History    Occupation: heavy equipment repair shop owner   Social Needs    Financial resource strain: Not on file    Food insecurity     Worry: Not on file     Inability: Not on file    Transportation needs     Medical: Not on file     Non-medical: Not on file   Tobacco Use    Smoking status: Never Smoker    Smokeless tobacco: Never Used   Substance and Sexual Activity    Alcohol use: No    Drug use: Not on file    Sexual activity: Not on file   Lifestyle    Physical activity     Days per week: Not on file     Minutes per session: Not on file    Stress: Not on file   Relationships    Social connections     Talks on phone: Not on file     Gets together: Not on file     Attends Restorationist service: Not on file     Active member of club or organization: Not on file     Attends meetings of clubs or organizations: Not on file     Relationship status: Not on file   Other Topics  "Concern    Not on file   Social History Narrative    Not on file       Current Outpatient Medications on File Prior to Visit   Medication Sig Dispense Refill    lisinopril 10 MG tablet Take 1 tablet (10 mg total) by mouth once daily. 90 tablet 3    [DISCONTINUED] fluticasone (FLONASE) 50 mcg/actuation nasal spray SHAKE LIQUID AND USE 1 SPRAY IN EACH NOSTRIL EVERY DAY 16 mL 0    [DISCONTINUED] tiZANidine (ZANAFLEX) 2 MG tablet Take 1-2 tablets (2-4 mg total) by mouth every 8 (eight) hours as needed. 30 tablet 1     No current facility-administered medications on file prior to visit.        Family History   Problem Relation Age of Onset    Hepatitis Mother     Diabetes Father     Diabetes Sister     Cancer Maternal Grandmother         lung    Cancer Maternal Grandfather         lung       Review of Systems   Constitutional: Negative for appetite change, chills, fever and unexpected weight change.   HENT: Negative for sore throat and trouble swallowing.    Eyes: Negative for pain and visual disturbance.   Respiratory: Negative for cough, chest tightness, shortness of breath and wheezing.    Cardiovascular: Negative for chest pain, palpitations and leg swelling.   Gastrointestinal: Positive for abdominal pain. Negative for blood in stool, constipation, diarrhea and nausea.   Genitourinary: Negative for difficulty urinating, dysuria and hematuria.   Musculoskeletal: Positive for back pain. Negative for arthralgias, gait problem and neck pain.   Skin: Negative for rash and wound.   Neurological: Negative for dizziness, weakness, light-headedness and headaches.   Hematological: Negative for adenopathy.   Psychiatric/Behavioral: Negative for dysphoric mood.       Objective:      BP (!) 134/94 (BP Location: Right arm, Patient Position: Sitting)   Pulse 62   Temp 98.7 °F (37.1 °C) (Oral)   Ht 6' 1" (1.854 m)   Wt 110.6 kg (243 lb 13.3 oz)   SpO2 98%   BMI 32.17 kg/m²   Physical Exam  Constitutional:       " General: He is not in acute distress.     Appearance: He is well-developed.   HENT:      Head: Normocephalic and atraumatic.      Right Ear: External ear normal.      Left Ear: External ear normal.      Mouth/Throat:      Pharynx: Uvula midline. No oropharyngeal exudate.   Eyes:      General: Lids are normal.      Conjunctiva/sclera: Conjunctivae normal.      Pupils: Pupils are equal, round, and reactive to light.   Neck:      Musculoskeletal: Full passive range of motion without pain, normal range of motion and neck supple.      Thyroid: No thyroid mass or thyromegaly.      Trachea: Phonation normal.   Cardiovascular:      Rate and Rhythm: Normal rate and regular rhythm.      Heart sounds: Normal heart sounds. No murmur. No friction rub. No gallop.    Pulmonary:      Effort: Pulmonary effort is normal. No respiratory distress.      Breath sounds: Normal breath sounds. No wheezing or rales.   Abdominal:      General: Abdomen is flat. There is no distension.      Palpations: Abdomen is soft. There is no mass.      Tenderness: There is no abdominal tenderness.      Hernia: A hernia is present. Hernia is present in the umbilical area (fingertip; easily reducible).   Musculoskeletal: Normal range of motion.   Lymphadenopathy:      Cervical: No cervical adenopathy.   Skin:     General: Skin is warm and dry.   Neurological:      Mental Status: He is alert and oriented to person, place, and time.      Cranial Nerves: No cranial nerve deficit.      Coordination: Coordination normal.   Psychiatric:         Speech: Speech normal.         Behavior: Behavior normal.         Thought Content: Thought content normal.         Judgment: Judgment normal.         Assessment:       1. Umbilical hernia without obstruction and without gangrene    2. Hypertension, unspecified type    3. Upper back pain        Plan:       Umbilical hernia without obstruction and without gangrene  -     US Abdomen Complete; Future; Expected date:  07/14/2020    Hypertension, unspecified type    Upper back pain        Reassurance regarding reducible hernia; continue weight loss  Resume lisinopril 10mg daily  Will get US to eval persistent right upper back pain and umbilical hernia  Counseled on regular exercise, maintenance of a healthy weight, balanced diet rich in fruits/vegetables and lean protein, and avoidance of unhealthy habits like smoking and excessive alcohol intake.

## 2020-07-22 ENCOUNTER — HOSPITAL ENCOUNTER (OUTPATIENT)
Dept: RADIOLOGY | Facility: HOSPITAL | Age: 45
Discharge: HOME | End: 2020-07-22
Attending: FAMILY MEDICINE
Payer: COMMERCIAL

## 2020-07-22 DIAGNOSIS — K42.9 UMBILICAL HERNIA WITHOUT OBSTRUCTION AND WITHOUT GANGRENE: ICD-10-CM

## 2020-07-22 PROCEDURE — 76700 US ABDOMEN COMPLETE: ICD-10-PCS | Mod: 26,,, | Performed by: RADIOLOGY

## 2020-07-22 PROCEDURE — 76700 US EXAM ABDOM COMPLETE: CPT | Mod: 26,,, | Performed by: RADIOLOGY

## 2020-07-22 PROCEDURE — 76700 US EXAM ABDOM COMPLETE: CPT | Mod: TC,PO

## 2020-10-20 ENCOUNTER — TELEPHONE (OUTPATIENT)
Dept: FAMILY MEDICINE | Facility: CLINIC | Age: 45
End: 2020-10-20

## 2020-10-20 NOTE — TELEPHONE ENCOUNTER
Outreach to patient for hypertension management.     RE: Need to find out if patient is monitoring blood pressure at home.  If so, the most recent blood pressure is needed to update the chart.    Message sent to patient's portal.    Most recent blood pressure reading was 134/94

## 2020-11-17 ENCOUNTER — PATIENT OUTREACH (OUTPATIENT)
Dept: ADMINISTRATIVE | Facility: HOSPITAL | Age: 45
End: 2020-11-17

## 2020-11-17 NOTE — PROGRESS NOTES
HTN GAP REPORT  LAST OFFICE VISIT DOCUMENTED READING WAS >140/90.  REQUESTING A REMOTE HOME BLOOD PRESSURE READING TO DOCUMENT IN DISCRETE FIELD OR PATIENT NEEDS TO SCHEDULE AN OFFICE VISIT OR NURSE VISIT FOR A BP CHECK

## 2021-01-11 ENCOUNTER — PATIENT MESSAGE (OUTPATIENT)
Dept: FAMILY MEDICINE | Facility: CLINIC | Age: 46
End: 2021-01-11

## 2021-01-11 DIAGNOSIS — I10 ESSENTIAL HYPERTENSION: ICD-10-CM

## 2021-01-12 RX ORDER — LISINOPRIL 10 MG/1
10 TABLET ORAL DAILY
Qty: 90 TABLET | Refills: 0 | Status: SHIPPED | OUTPATIENT
Start: 2021-01-12 | End: 2021-04-26

## 2021-01-13 ENCOUNTER — PATIENT OUTREACH (OUTPATIENT)
Dept: ADMINISTRATIVE | Facility: HOSPITAL | Age: 46
End: 2021-01-13

## 2021-01-27 ENCOUNTER — LAB VISIT (OUTPATIENT)
Dept: LAB | Facility: HOSPITAL | Age: 46
End: 2021-01-27
Attending: FAMILY MEDICINE
Payer: COMMERCIAL

## 2021-01-27 ENCOUNTER — OFFICE VISIT (OUTPATIENT)
Dept: FAMILY MEDICINE | Facility: CLINIC | Age: 46
End: 2021-01-27
Payer: COMMERCIAL

## 2021-01-27 VITALS
SYSTOLIC BLOOD PRESSURE: 130 MMHG | TEMPERATURE: 98 F | DIASTOLIC BLOOD PRESSURE: 70 MMHG | BODY MASS INDEX: 35.03 KG/M2 | HEIGHT: 73 IN | HEART RATE: 73 BPM | RESPIRATION RATE: 18 BRPM | WEIGHT: 264.31 LBS

## 2021-01-27 DIAGNOSIS — I10 ESSENTIAL HYPERTENSION: ICD-10-CM

## 2021-01-27 DIAGNOSIS — Z00.00 PREVENTATIVE HEALTH CARE: Primary | ICD-10-CM

## 2021-01-27 DIAGNOSIS — R63.5 WEIGHT GAIN: ICD-10-CM

## 2021-01-27 LAB
ALBUMIN SERPL BCP-MCNC: 3.9 G/DL (ref 3.5–5.2)
ALP SERPL-CCNC: 80 U/L (ref 55–135)
ALT SERPL W/O P-5'-P-CCNC: 23 U/L (ref 10–44)
ANION GAP SERPL CALC-SCNC: 6 MMOL/L (ref 8–16)
AST SERPL-CCNC: 20 U/L (ref 10–40)
BILIRUB SERPL-MCNC: 0.7 MG/DL (ref 0.1–1)
BUN SERPL-MCNC: 23 MG/DL (ref 6–20)
CALCIUM SERPL-MCNC: 8.7 MG/DL (ref 8.7–10.5)
CHLORIDE SERPL-SCNC: 109 MMOL/L (ref 95–110)
CHOLEST SERPL-MCNC: 152 MG/DL (ref 120–199)
CHOLEST/HDLC SERPL: 4.5 {RATIO} (ref 2–5)
CO2 SERPL-SCNC: 26 MMOL/L (ref 23–29)
CREAT SERPL-MCNC: 0.9 MG/DL (ref 0.5–1.4)
EST. GFR  (AFRICAN AMERICAN): >60 ML/MIN/1.73 M^2
EST. GFR  (NON AFRICAN AMERICAN): >60 ML/MIN/1.73 M^2
GLUCOSE SERPL-MCNC: 115 MG/DL (ref 70–110)
HDLC SERPL-MCNC: 34 MG/DL (ref 40–75)
HDLC SERPL: 22.4 % (ref 20–50)
LDLC SERPL CALC-MCNC: 78.2 MG/DL (ref 63–159)
NONHDLC SERPL-MCNC: 118 MG/DL
POTASSIUM SERPL-SCNC: 4 MMOL/L (ref 3.5–5.1)
PROT SERPL-MCNC: 6.6 G/DL (ref 6–8.4)
SODIUM SERPL-SCNC: 141 MMOL/L (ref 136–145)
TRIGL SERPL-MCNC: 199 MG/DL (ref 30–150)

## 2021-01-27 PROCEDURE — 1125F PR PAIN SEVERITY QUANTIFIED, PAIN PRESENT: ICD-10-PCS | Mod: S$GLB,,, | Performed by: FAMILY MEDICINE

## 2021-01-27 PROCEDURE — 80053 COMPREHEN METABOLIC PANEL: CPT

## 2021-01-27 PROCEDURE — 1125F AMNT PAIN NOTED PAIN PRSNT: CPT | Mod: S$GLB,,, | Performed by: FAMILY MEDICINE

## 2021-01-27 PROCEDURE — 99999 PR PBB SHADOW E&M-EST. PATIENT-LVL III: ICD-10-PCS | Mod: PBBFAC,,, | Performed by: FAMILY MEDICINE

## 2021-01-27 PROCEDURE — 99396 PR PREVENTIVE VISIT,EST,40-64: ICD-10-PCS | Mod: S$GLB,,, | Performed by: FAMILY MEDICINE

## 2021-01-27 PROCEDURE — 3078F PR MOST RECENT DIASTOLIC BLOOD PRESSURE < 80 MM HG: ICD-10-PCS | Mod: CPTII,S$GLB,, | Performed by: FAMILY MEDICINE

## 2021-01-27 PROCEDURE — 3008F PR BODY MASS INDEX (BMI) DOCUMENTED: ICD-10-PCS | Mod: CPTII,S$GLB,, | Performed by: FAMILY MEDICINE

## 2021-01-27 PROCEDURE — 80061 LIPID PANEL: CPT

## 2021-01-27 PROCEDURE — 99396 PREV VISIT EST AGE 40-64: CPT | Mod: S$GLB,,, | Performed by: FAMILY MEDICINE

## 2021-01-27 PROCEDURE — 3075F SYST BP GE 130 - 139MM HG: CPT | Mod: CPTII,S$GLB,, | Performed by: FAMILY MEDICINE

## 2021-01-27 PROCEDURE — 3075F PR MOST RECENT SYSTOLIC BLOOD PRESS GE 130-139MM HG: ICD-10-PCS | Mod: CPTII,S$GLB,, | Performed by: FAMILY MEDICINE

## 2021-01-27 PROCEDURE — 36415 COLL VENOUS BLD VENIPUNCTURE: CPT | Mod: PO

## 2021-01-27 PROCEDURE — 3078F DIAST BP <80 MM HG: CPT | Mod: CPTII,S$GLB,, | Performed by: FAMILY MEDICINE

## 2021-01-27 PROCEDURE — 99999 PR PBB SHADOW E&M-EST. PATIENT-LVL III: CPT | Mod: PBBFAC,,, | Performed by: FAMILY MEDICINE

## 2021-01-27 PROCEDURE — 3008F BODY MASS INDEX DOCD: CPT | Mod: CPTII,S$GLB,, | Performed by: FAMILY MEDICINE

## 2021-01-27 RX ORDER — PHENTERMINE HYDROCHLORIDE 37.5 MG/1
37.5 TABLET ORAL
Qty: 30 TABLET | Refills: 2 | Status: SHIPPED | OUTPATIENT
Start: 2021-01-27 | End: 2021-02-26

## 2021-02-19 DIAGNOSIS — M54.50 CHRONIC BILATERAL LOW BACK PAIN WITHOUT SCIATICA: ICD-10-CM

## 2021-02-19 DIAGNOSIS — G89.29 CHRONIC BILATERAL LOW BACK PAIN WITHOUT SCIATICA: ICD-10-CM

## 2021-02-19 DIAGNOSIS — M79.18 MYOFASCIAL PAIN SYNDROME: ICD-10-CM

## 2021-02-19 RX ORDER — TIZANIDINE 2 MG/1
2-4 TABLET ORAL EVERY 8 HOURS PRN
Qty: 30 TABLET | Refills: 0 | Status: SHIPPED | OUTPATIENT
Start: 2021-02-19

## 2021-04-26 DIAGNOSIS — I10 ESSENTIAL HYPERTENSION: ICD-10-CM

## 2021-04-26 RX ORDER — LISINOPRIL 10 MG/1
TABLET ORAL
Qty: 90 TABLET | Refills: 3 | Status: SHIPPED | OUTPATIENT
Start: 2021-04-26 | End: 2022-08-01 | Stop reason: SDUPTHER

## 2022-02-24 ENCOUNTER — PATIENT MESSAGE (OUTPATIENT)
Dept: ADMINISTRATIVE | Facility: HOSPITAL | Age: 47
End: 2022-02-24
Payer: COMMERCIAL

## 2022-02-24 ENCOUNTER — PATIENT OUTREACH (OUTPATIENT)
Dept: ADMINISTRATIVE | Facility: HOSPITAL | Age: 47
End: 2022-02-24
Payer: COMMERCIAL

## 2022-02-24 NOTE — PROGRESS NOTES
Uncontrolled BP >140/90  BP Readings from Last 3 Encounters:   01/27/21 130/70   07/14/20 (!) 134/94   10/14/19 100/70             DUE FOR OFFICE VISIT.  LAST SEEN >12 MONTHS    Also due for colonsocopy  BLOOD PRESSURE FOLLOW UP

## 2022-02-24 NOTE — LETTER
March 4, 2022    Tl Butts  [address]  [place] [address]             [facility]  [address]  [facility] [address]  Phone: [phone] Dear Tl,     We have tried to reach you by [facility] email unsuccessfully.    [facility] is committed to your overall health. Currently, we have Nixon Bell MD listed as your primary care provider. If this is incorrect, please let us know by emailing or contacting our office at [phone] so we can update our records.     Our records show you have not been seen in twelve months by your primary care provider. Please schedule online through your [email or web address]  account or call our office at [phone].   You may also be due for the following test and/or procedures:     Colon cancer screening        If you already have any of the above scheduled, please disregard this message.  If you have had any of the above done at a non-[facility] facility, please notify us so that we can obtain a copy or bring a copy to your next Primary Care visit.     Your [facility] care team is committed to supporting your overall health. We look forward to seeing you soon and appreciate the opportunity to serve you.        Sincerely,     Nixon Bell MD and your [facility] Primary Care Team

## 2022-05-26 ENCOUNTER — PATIENT MESSAGE (OUTPATIENT)
Dept: ADMINISTRATIVE | Facility: HOSPITAL | Age: 47
End: 2022-05-26
Payer: COMMERCIAL

## 2022-05-26 ENCOUNTER — PATIENT OUTREACH (OUTPATIENT)
Dept: ADMINISTRATIVE | Facility: HOSPITAL | Age: 47
End: 2022-05-26
Payer: COMMERCIAL

## 2022-05-26 NOTE — PROGRESS NOTES
Non-compliant GAP report chart review - Chart review completed for the following HM test if overdue  Colonoscopy,   Care Everywhere and Media reports - updates requested and reviewed.      COLON CANCER SCREENING     PORTAL SENT

## 2022-05-31 ENCOUNTER — PATIENT MESSAGE (OUTPATIENT)
Dept: ADMINISTRATIVE | Facility: HOSPITAL | Age: 47
End: 2022-05-31
Payer: COMMERCIAL

## 2022-06-30 ENCOUNTER — PATIENT MESSAGE (OUTPATIENT)
Dept: ADMINISTRATIVE | Facility: HOSPITAL | Age: 47
End: 2022-06-30
Payer: COMMERCIAL

## 2022-06-30 ENCOUNTER — PATIENT OUTREACH (OUTPATIENT)
Dept: ADMINISTRATIVE | Facility: HOSPITAL | Age: 47
End: 2022-06-30
Payer: COMMERCIAL

## 2022-06-30 NOTE — PROGRESS NOTES
Uncontrolled BP REPORT  BP Readings from Last 3 Encounters:   01/27/21 130/70   07/14/20 (!) 134/94   10/14/19 100/70       Non-compliant report chart audits for HYPERTENSION MANAGEMENT     Outreach to patient in reference to hypertension management       BLOOD PRESSURE FOLLOW UP NEEDED WITH A CARE TEAM MEMBER    ACTIVE PORTAL MESSAGE OR LETTER SENT

## 2022-07-27 DIAGNOSIS — Z12.11 COLON CANCER SCREENING: ICD-10-CM

## 2022-08-01 ENCOUNTER — PATIENT MESSAGE (OUTPATIENT)
Dept: ADMINISTRATIVE | Facility: HOSPITAL | Age: 47
End: 2022-08-01
Payer: COMMERCIAL

## 2022-08-01 ENCOUNTER — PATIENT OUTREACH (OUTPATIENT)
Dept: ADMINISTRATIVE | Facility: HOSPITAL | Age: 47
End: 2022-08-01
Payer: COMMERCIAL

## 2022-08-08 ENCOUNTER — OFFICE VISIT (OUTPATIENT)
Dept: FAMILY MEDICINE | Facility: CLINIC | Age: 47
End: 2022-08-08
Payer: COMMERCIAL

## 2022-08-08 ENCOUNTER — LAB VISIT (OUTPATIENT)
Dept: LAB | Facility: HOSPITAL | Age: 47
End: 2022-08-08
Attending: FAMILY MEDICINE
Payer: COMMERCIAL

## 2022-08-08 VITALS
BODY MASS INDEX: 35.94 KG/M2 | WEIGHT: 271.19 LBS | TEMPERATURE: 98 F | HEIGHT: 73 IN | OXYGEN SATURATION: 96 % | SYSTOLIC BLOOD PRESSURE: 134 MMHG | DIASTOLIC BLOOD PRESSURE: 70 MMHG | HEART RATE: 73 BPM | RESPIRATION RATE: 18 BRPM

## 2022-08-08 DIAGNOSIS — Z00.00 PREVENTATIVE HEALTH CARE: Primary | ICD-10-CM

## 2022-08-08 DIAGNOSIS — Z00.00 PREVENTATIVE HEALTH CARE: ICD-10-CM

## 2022-08-08 DIAGNOSIS — I10 ESSENTIAL HYPERTENSION: ICD-10-CM

## 2022-08-08 LAB
ALBUMIN SERPL BCP-MCNC: 3.9 G/DL (ref 3.5–5.2)
ALP SERPL-CCNC: 82 U/L (ref 55–135)
ALT SERPL W/O P-5'-P-CCNC: 25 U/L (ref 10–44)
ANION GAP SERPL CALC-SCNC: 12 MMOL/L (ref 8–16)
AST SERPL-CCNC: 25 U/L (ref 10–40)
BILIRUB SERPL-MCNC: 0.2 MG/DL (ref 0.1–1)
BUN SERPL-MCNC: 18 MG/DL (ref 6–20)
CALCIUM SERPL-MCNC: 8.9 MG/DL (ref 8.7–10.5)
CHLORIDE SERPL-SCNC: 105 MMOL/L (ref 95–110)
CHOLEST SERPL-MCNC: 200 MG/DL (ref 120–199)
CHOLEST/HDLC SERPL: 6.1 {RATIO} (ref 2–5)
CO2 SERPL-SCNC: 23 MMOL/L (ref 23–29)
CREAT SERPL-MCNC: 1 MG/DL (ref 0.5–1.4)
EST. GFR  (NO RACE VARIABLE): >60 ML/MIN/1.73 M^2
GLUCOSE SERPL-MCNC: 117 MG/DL (ref 70–110)
HDLC SERPL-MCNC: 33 MG/DL (ref 40–75)
HDLC SERPL: 16.5 % (ref 20–50)
LDLC SERPL CALC-MCNC: ABNORMAL MG/DL (ref 63–159)
NONHDLC SERPL-MCNC: 167 MG/DL
POTASSIUM SERPL-SCNC: 4.5 MMOL/L (ref 3.5–5.1)
PROT SERPL-MCNC: 6.8 G/DL (ref 6–8.4)
SODIUM SERPL-SCNC: 140 MMOL/L (ref 136–145)
TRIGL SERPL-MCNC: 489 MG/DL (ref 30–150)

## 2022-08-08 PROCEDURE — 1159F MED LIST DOCD IN RCRD: CPT | Mod: CPTII,S$GLB,, | Performed by: FAMILY MEDICINE

## 2022-08-08 PROCEDURE — 3075F SYST BP GE 130 - 139MM HG: CPT | Mod: CPTII,S$GLB,, | Performed by: FAMILY MEDICINE

## 2022-08-08 PROCEDURE — 99999 PR PBB SHADOW E&M-EST. PATIENT-LVL III: CPT | Mod: PBBFAC,,, | Performed by: FAMILY MEDICINE

## 2022-08-08 PROCEDURE — 4010F PR ACE/ARB THEARPY RXD/TAKEN: ICD-10-PCS | Mod: CPTII,S$GLB,, | Performed by: FAMILY MEDICINE

## 2022-08-08 PROCEDURE — 1160F RVW MEDS BY RX/DR IN RCRD: CPT | Mod: CPTII,S$GLB,, | Performed by: FAMILY MEDICINE

## 2022-08-08 PROCEDURE — 99396 PREV VISIT EST AGE 40-64: CPT | Mod: S$GLB,,, | Performed by: FAMILY MEDICINE

## 2022-08-08 PROCEDURE — 4010F ACE/ARB THERAPY RXD/TAKEN: CPT | Mod: CPTII,S$GLB,, | Performed by: FAMILY MEDICINE

## 2022-08-08 PROCEDURE — 80053 COMPREHEN METABOLIC PANEL: CPT | Performed by: FAMILY MEDICINE

## 2022-08-08 PROCEDURE — 1160F PR REVIEW ALL MEDS BY PRESCRIBER/CLIN PHARMACIST DOCUMENTED: ICD-10-PCS | Mod: CPTII,S$GLB,, | Performed by: FAMILY MEDICINE

## 2022-08-08 PROCEDURE — 3075F PR MOST RECENT SYSTOLIC BLOOD PRESS GE 130-139MM HG: ICD-10-PCS | Mod: CPTII,S$GLB,, | Performed by: FAMILY MEDICINE

## 2022-08-08 PROCEDURE — 3008F PR BODY MASS INDEX (BMI) DOCUMENTED: ICD-10-PCS | Mod: CPTII,S$GLB,, | Performed by: FAMILY MEDICINE

## 2022-08-08 PROCEDURE — 3078F PR MOST RECENT DIASTOLIC BLOOD PRESSURE < 80 MM HG: ICD-10-PCS | Mod: CPTII,S$GLB,, | Performed by: FAMILY MEDICINE

## 2022-08-08 PROCEDURE — 3008F BODY MASS INDEX DOCD: CPT | Mod: CPTII,S$GLB,, | Performed by: FAMILY MEDICINE

## 2022-08-08 PROCEDURE — 99396 PR PREVENTIVE VISIT,EST,40-64: ICD-10-PCS | Mod: S$GLB,,, | Performed by: FAMILY MEDICINE

## 2022-08-08 PROCEDURE — 1159F PR MEDICATION LIST DOCUMENTED IN MEDICAL RECORD: ICD-10-PCS | Mod: CPTII,S$GLB,, | Performed by: FAMILY MEDICINE

## 2022-08-08 PROCEDURE — 36415 COLL VENOUS BLD VENIPUNCTURE: CPT | Mod: PO | Performed by: FAMILY MEDICINE

## 2022-08-08 PROCEDURE — 3078F DIAST BP <80 MM HG: CPT | Mod: CPTII,S$GLB,, | Performed by: FAMILY MEDICINE

## 2022-08-08 PROCEDURE — 80061 LIPID PANEL: CPT | Performed by: FAMILY MEDICINE

## 2022-08-08 PROCEDURE — 99999 PR PBB SHADOW E&M-EST. PATIENT-LVL III: ICD-10-PCS | Mod: PBBFAC,,, | Performed by: FAMILY MEDICINE

## 2022-08-08 RX ORDER — LISINOPRIL 10 MG/1
10 TABLET ORAL DAILY
Qty: 90 TABLET | Refills: 3 | Status: SHIPPED | OUTPATIENT
Start: 2022-08-08 | End: 2023-10-16

## 2022-08-08 NOTE — PROGRESS NOTES
Subjective:       Patient ID: Tl Butts is a 47 y.o. male.    Chief Complaint: Preventative Health Care (Physical)    HPI Comments:   Here for annual exam and to f/u on chronic health issues.    HTN - tolerating Lisinopril 10 mg daily  HLD - following low-fat diet    Past Medical History:   Diagnosis Date    Chronic headaches     Elevated cholesterol     Hypertension     Lumbar spondylosis        Past Surgical History:   Procedure Laterality Date    KNEE SURGERY  age 16       Allergies   Allergen Reactions    Penicillins      Other reaction(s): Unknown       Social History     Socioeconomic History    Marital status:     Number of children: 3   Occupational History    Occupation: heavy equipment repair shop owner   Tobacco Use    Smoking status: Never Smoker    Smokeless tobacco: Never Used   Substance and Sexual Activity    Alcohol use: No       Current Outpatient Medications on File Prior to Visit   Medication Sig Dispense Refill    [DISCONTINUED] lisinopriL 10 MG tablet Take 1 tablet (10 mg total) by mouth once daily. 30 tablet 0    tiZANidine (ZANAFLEX) 2 MG tablet Take 1-2 tablets (2-4 mg total) by mouth every 8 (eight) hours as needed (muscle spasm). (Patient not taking: Reported on 8/8/2022) 30 tablet 0     No current facility-administered medications on file prior to visit.       Family History   Problem Relation Age of Onset    Hepatitis Mother     Diabetes Father     Bladder Cancer Father     Diabetes Sister     Cancer Maternal Grandmother         lung    Cancer Maternal Grandfather         lung       Hypertension  Associated symptoms include headaches and palpitations. Pertinent negatives include no chest pain, neck pain or shortness of breath.     Review of Systems   Constitutional: Negative for fever, appetite change, fatigue and unexpected weight change.   HENT: Negative for ear pain, nosebleeds, congestion, sore throat, rhinorrhea, trouble swallowing, neck pain,  "postnasal drip, sinus pressure and ear discharge.    Eyes: Negative for pain, discharge, redness and visual disturbance.   Respiratory: Negative for apnea, cough, chest tightness, shortness of breath and wheezing.    Cardiovascular: Negative for chest pain and leg swelling.   Gastrointestinal: Negative for nausea, vomiting, abdominal pain, diarrhea, constipation, blood in stool and abdominal distention.   Musculoskeletal: Negative for myalgias, joint swelling and gait problem. + back pain  Skin: Negative for color change, rash and wound.   Neurological: Negative for dizziness, tremors, seizures, syncope, speech difficulty, weakness, light-headedness and numbness. Regular headaches  Hematological: Negative for adenopathy. Does not bruise/bleed easily.   Psychiatric/Behavioral: Negative for suicidal ideas, hallucinations, behavioral problems and confusion.       Objective:      /70   Pulse 73   Temp 97.9 °F (36.6 °C) (Oral)   Resp 18   Ht 6' 1" (1.854 m)   Wt 123 kg (271 lb 2.7 oz)   SpO2 96%   BMI 35.78 kg/m²     Physical Exam   Constitutional: He is oriented to person, place, and time. He appears well-developed and well-nourished. He is active and cooperative.   HENT:   Head: Normocephalic and atraumatic.   Nose: Nose normal.   Mouth/Throat: Oropharynx is clear and moist. No oropharyngeal exudate.   Eyes: Conjunctivae and EOM are normal. Pupils are equal, round, and reactive to light. Right eye exhibits no discharge. Left eye exhibits no discharge. No scleral icterus.   Neck: Trachea normal, normal range of motion and full passive range of motion without pain. Neck supple. Normal carotid pulses and no JVD present. Carotid bruit is not present. No tracheal deviation present. No mass and no thyromegaly present.   Cardiovascular: Normal rate, regular rhythm, S1 normal, S2 normal, normal heart sounds and intact distal pulses.  Exam reveals no gallop and no friction rub.    No murmur heard.  Pulmonary/Chest: " Effort normal and breath sounds normal. No respiratory distress. He has no wheezes. He has no rales.   Abdominal: Soft. Normal appearance and bowel sounds are normal. He exhibits no distension and no mass. There is no hepatosplenomegaly. There is no tenderness. There is no rebound and no guarding. reducible umbilical hernia.  Neurological: He is alert and oriented to person, place, and time. He has normal strength. No cranial nerve deficit. Coordination normal.   Skin: Skin is warm, dry and intact. No rash noted. No cyanosis or erythema. No pallor. Nails show no clubbing.   Psychiatric: He has a normal mood and affect. His speech is normal and behavior is normal. Judgment and thought content normal.       Results for orders placed or performed in visit on 01/27/21   Comprehensive Metabolic Panel   Result Value Ref Range    Sodium 141 136 - 145 mmol/L    Potassium 4.0 3.5 - 5.1 mmol/L    Chloride 109 95 - 110 mmol/L    CO2 26 23 - 29 mmol/L    Glucose 115 (H) 70 - 110 mg/dL    BUN 23 (H) 6 - 20 mg/dL    Creatinine 0.9 0.5 - 1.4 mg/dL    Calcium 8.7 8.7 - 10.5 mg/dL    Total Protein 6.6 6.0 - 8.4 g/dL    Albumin 3.9 3.5 - 5.2 g/dL    Total Bilirubin 0.7 0.1 - 1.0 mg/dL    Alkaline Phosphatase 80 55 - 135 U/L    AST 20 10 - 40 U/L    ALT 23 10 - 44 U/L    Anion Gap 6 (L) 8 - 16 mmol/L    eGFR if African American >60.0 >60 mL/min/1.73 m^2    eGFR if non African American >60.0 >60 mL/min/1.73 m^2   Lipid Panel   Result Value Ref Range    Cholesterol 152 120 - 199 mg/dL    Triglycerides 199 (H) 30 - 150 mg/dL    HDL 34 (L) 40 - 75 mg/dL    LDL Cholesterol 78.2 63.0 - 159.0 mg/dL    HDL/Cholesterol Ratio 22.4 20.0 - 50.0 %    Total Cholesterol/HDL Ratio 4.5 2.0 - 5.0    Non-HDL Cholesterol 118 mg/dL     Assessment:       1. Preventative health care    2. Essential hypertension        Plan:       Preventative health care  -     Comprehensive Metabolic Panel; Future; Expected date: 08/08/2022  -     Lipid Panel; Future;  Expected date: 08/08/2022    Essential hypertension  -     lisinopriL 10 MG tablet; Take 1 tablet (10 mg total) by mouth once daily.  Dispense: 90 tablet; Refill: 3     Fit Kit  continue lisinopril 10mg daily  Continue weight loss efforts  Counseled on regular exercise, maintenance of a healthy weight, balanced diet rich in fruits/vegetables and lean protein, and avoidance of unhealthy habits like smoking and excessive alcohol intake.  F/u annually for physical with labs

## 2023-02-09 ENCOUNTER — PATIENT MESSAGE (OUTPATIENT)
Dept: ADMINISTRATIVE | Facility: HOSPITAL | Age: 48
End: 2023-02-09
Payer: COMMERCIAL

## 2023-08-24 ENCOUNTER — PATIENT OUTREACH (OUTPATIENT)
Dept: ADMINISTRATIVE | Facility: HOSPITAL | Age: 48
End: 2023-08-24
Payer: COMMERCIAL

## 2023-08-24 ENCOUNTER — PATIENT MESSAGE (OUTPATIENT)
Dept: ADMINISTRATIVE | Facility: HOSPITAL | Age: 48
End: 2023-08-24
Payer: COMMERCIAL

## 2023-08-24 NOTE — PROGRESS NOTES
DUE FOR AN OFFICE VISIT WITH Arabella, Nixon MERINO MD.      Health Maintenance Due   Topic Date Due    COVID-19 Vaccine (1) Never done    Hemoglobin A1c (Diabetic Prevention Screening)  11/08/2020    Colorectal Cancer Screening  Never done    Lipid Panel  08/08/2023

## 2024-07-09 ENCOUNTER — PATIENT MESSAGE (OUTPATIENT)
Dept: ADMINISTRATIVE | Facility: HOSPITAL | Age: 49
End: 2024-07-09
Payer: COMMERCIAL

## 2024-12-03 DIAGNOSIS — I10 ESSENTIAL HYPERTENSION: ICD-10-CM

## 2024-12-03 RX ORDER — LISINOPRIL 10 MG/1
10 TABLET ORAL
Qty: 90 TABLET | Refills: 3 | OUTPATIENT
Start: 2024-12-03

## 2024-12-03 NOTE — TELEPHONE ENCOUNTER
Care Due:                  Date            Visit Type   Department     Provider  --------------------------------------------------------------------------------    Last Visit: None Found      None         None Found  Next Visit: None Scheduled  None         None Found                                                            Last  Test          Frequency    Reason                     Performed    Due Date  --------------------------------------------------------------------------------    Office Visit  15 months..  lisinopriL...............  Not Found    Overdue    CMP.........  12 months..  lisinopriL...............  Not Found    Overdue    [employer] Embedded Care Due Messages. Reference number: [id number].   12/03/2024 6:40:59 AM CST

## 2024-12-05 ENCOUNTER — LAB VISIT (OUTPATIENT)
Dept: LAB | Facility: HOSPITAL | Age: 49
End: 2024-12-05
Attending: FAMILY MEDICINE
Payer: COMMERCIAL

## 2024-12-05 ENCOUNTER — OFFICE VISIT (OUTPATIENT)
Dept: FAMILY MEDICINE | Facility: CLINIC | Age: 49
End: 2024-12-05
Payer: COMMERCIAL

## 2024-12-05 VITALS
DIASTOLIC BLOOD PRESSURE: 78 MMHG | BODY MASS INDEX: 36.58 KG/M2 | RESPIRATION RATE: 18 BRPM | SYSTOLIC BLOOD PRESSURE: 128 MMHG | HEIGHT: 73 IN | WEIGHT: 276 LBS | HEART RATE: 79 BPM | OXYGEN SATURATION: 98 %

## 2024-12-05 DIAGNOSIS — Z00.00 PREVENTATIVE HEALTH CARE: ICD-10-CM

## 2024-12-05 DIAGNOSIS — R73.09 ABNORMAL GLUCOSE: ICD-10-CM

## 2024-12-05 DIAGNOSIS — E66.01 SEVERE OBESITY (BMI 35.0-39.9) WITH COMORBIDITY: ICD-10-CM

## 2024-12-05 DIAGNOSIS — Z12.11 COLON CANCER SCREENING: ICD-10-CM

## 2024-12-05 DIAGNOSIS — I10 ESSENTIAL HYPERTENSION: ICD-10-CM

## 2024-12-05 DIAGNOSIS — E78.5 HYPERLIPIDEMIA, UNSPECIFIED HYPERLIPIDEMIA TYPE: ICD-10-CM

## 2024-12-05 DIAGNOSIS — R29.818 SUSPECTED SLEEP APNEA: ICD-10-CM

## 2024-12-05 DIAGNOSIS — Z00.00 PREVENTATIVE HEALTH CARE: Primary | ICD-10-CM

## 2024-12-05 LAB
ALBUMIN SERPL BCP-MCNC: 3.9 G/DL (ref 3.5–5.2)
ALP SERPL-CCNC: 83 U/L (ref 40–150)
ALT SERPL W/O P-5'-P-CCNC: 28 U/L (ref 10–44)
ANION GAP SERPL CALC-SCNC: 9 MMOL/L (ref 8–16)
AST SERPL-CCNC: 18 U/L (ref 10–40)
BILIRUB SERPL-MCNC: 0.5 MG/DL (ref 0.1–1)
BUN SERPL-MCNC: 16 MG/DL (ref 6–20)
CALCIUM SERPL-MCNC: 9.1 MG/DL (ref 8.7–10.5)
CHLORIDE SERPL-SCNC: 108 MMOL/L (ref 95–110)
CHOLEST SERPL-MCNC: 171 MG/DL (ref 120–199)
CHOLEST/HDLC SERPL: 4.9 {RATIO} (ref 2–5)
CO2 SERPL-SCNC: 22 MMOL/L (ref 23–29)
CREAT SERPL-MCNC: 1 MG/DL (ref 0.5–1.4)
EST. GFR  (NO RACE VARIABLE): >60 ML/MIN/1.73 M^2
ESTIMATED AVG GLUCOSE: 114 MG/DL (ref 68–131)
GLUCOSE SERPL-MCNC: 118 MG/DL (ref 70–110)
HBA1C MFR BLD: 5.6 % (ref 4–5.6)
HDLC SERPL-MCNC: 35 MG/DL (ref 40–75)
HDLC SERPL: 20.5 % (ref 20–50)
LDLC SERPL CALC-MCNC: 104.4 MG/DL (ref 63–159)
NONHDLC SERPL-MCNC: 136 MG/DL
POTASSIUM SERPL-SCNC: 4.6 MMOL/L (ref 3.5–5.1)
PROT SERPL-MCNC: 6.8 G/DL (ref 6–8.4)
SODIUM SERPL-SCNC: 139 MMOL/L (ref 136–145)
TRIGL SERPL-MCNC: 158 MG/DL (ref 30–150)
TSH SERPL DL<=0.005 MIU/L-ACNC: 1.79 UIU/ML (ref 0.4–4)

## 2024-12-05 PROCEDURE — 83036 HEMOGLOBIN GLYCOSYLATED A1C: CPT | Performed by: FAMILY MEDICINE

## 2024-12-05 PROCEDURE — 84443 ASSAY THYROID STIM HORMONE: CPT | Performed by: FAMILY MEDICINE

## 2024-12-05 PROCEDURE — 80053 COMPREHEN METABOLIC PANEL: CPT | Performed by: FAMILY MEDICINE

## 2024-12-05 PROCEDURE — 1160F RVW MEDS BY RX/DR IN RCRD: CPT | Mod: CPTII,S$GLB,, | Performed by: FAMILY MEDICINE

## 2024-12-05 PROCEDURE — 3078F DIAST BP <80 MM HG: CPT | Mod: CPTII,S$GLB,, | Performed by: FAMILY MEDICINE

## 2024-12-05 PROCEDURE — 3008F BODY MASS INDEX DOCD: CPT | Mod: CPTII,S$GLB,, | Performed by: FAMILY MEDICINE

## 2024-12-05 PROCEDURE — 80061 LIPID PANEL: CPT | Performed by: FAMILY MEDICINE

## 2024-12-05 PROCEDURE — 1159F MED LIST DOCD IN RCRD: CPT | Mod: CPTII,S$GLB,, | Performed by: FAMILY MEDICINE

## 2024-12-05 PROCEDURE — 4010F ACE/ARB THERAPY RXD/TAKEN: CPT | Mod: CPTII,S$GLB,, | Performed by: FAMILY MEDICINE

## 2024-12-05 PROCEDURE — 99396 PREV VISIT EST AGE 40-64: CPT | Mod: S$GLB,,, | Performed by: FAMILY MEDICINE

## 2024-12-05 PROCEDURE — 99999 PR PBB SHADOW E&M-EST. PATIENT-LVL III: CPT | Mod: PBBFAC,,, | Performed by: FAMILY MEDICINE

## 2024-12-05 PROCEDURE — 3074F SYST BP LT 130 MM HG: CPT | Mod: CPTII,S$GLB,, | Performed by: FAMILY MEDICINE

## 2024-12-05 RX ORDER — LISINOPRIL 10 MG/1
10 TABLET ORAL DAILY
Qty: 90 TABLET | Refills: 3 | Status: SHIPPED | OUTPATIENT
Start: 2024-12-05

## 2024-12-05 NOTE — PROGRESS NOTES
Subjective:       Patient ID: Tl Butts is a 49 y.o. male.    Chief Complaint: Preventative health care (Physical)    HPI Comments:   Here for annual exam and to f/u on chronic health issues.    HTN - tolerating Lisinopril 10 mg daily  HLD - following low-fat diet    C/o regular nighttime awakenings, interrupted sleep, daytime fatigue. He is concerned for sleep apnea.  He falls asleep easily.  Wearing breath rite sleep at night.    Past Medical History:   Diagnosis Date    Chronic headaches     Elevated cholesterol     Hypertension     Lumbar spondylosis        Past Surgical History:   Procedure Laterality Date    KNEE SURGERY  age 16       Allergies   Allergen Reactions    Penicillins      Other reaction(s): Unknown       Social History     Socioeconomic History    Marital status:     Number of children: 3   Occupational History    Occupation: heavy equipment repair shop owner   Tobacco Use    Smoking status: Never    Smokeless tobacco: Never   Substance and Sexual Activity    Alcohol use: No       Current Outpatient Medications on File Prior to Visit   Medication Sig Dispense Refill    [DISCONTINUED] lisinopriL 10 MG tablet TAKE 1 TABLET(10 MG) BY MOUTH EVERY DAY 90 tablet 3    [DISCONTINUED] tiZANidine (ZANAFLEX) 2 MG tablet Take 1-2 tablets (2-4 mg total) by mouth every 8 (eight) hours as needed (muscle spasm). (Patient not taking: Reported on 12/5/2024) 30 tablet 0     No current facility-administered medications on file prior to visit.       Family History   Problem Relation Name Age of Onset    Hepatitis Mother      Diabetes Father      Bladder Cancer Father      Diabetes Sister      Cancer Maternal Grandmother          lung    Cancer Maternal Grandfather          lung       Hypertension  Associated symptoms include headaches and palpitations. Pertinent negatives include no chest pain, neck pain or shortness of breath.     Review of Systems   Constitutional: Negative for fever, appetite change,  "fatigue and unexpected weight change.   HENT: Negative for ear pain, nosebleeds, congestion, sore throat, rhinorrhea, trouble swallowing, neck pain, postnasal drip, sinus pressure and ear discharge.    Eyes: Negative for pain, discharge, redness and visual disturbance.   Respiratory: Negative for apnea, cough, chest tightness, shortness of breath and wheezing.    Cardiovascular: Negative for chest pain and leg swelling.   Gastrointestinal: Negative for nausea, vomiting, abdominal pain, diarrhea, constipation, blood in stool and abdominal distention.   Musculoskeletal: Negative for myalgias, joint swelling and gait problem. + back pain  Skin: Negative for color change, rash and wound.   Neurological: Negative for dizziness, tremors, seizures, syncope, speech difficulty, weakness, light-headedness and numbness. Regular headaches  Hematological: Negative for adenopathy. Does not bruise/bleed easily.   Psychiatric/Behavioral: Negative for suicidal ideas, hallucinations, behavioral problems and confusion.       Objective:      /78   Pulse 79   Resp 18   Ht 6' 1" (1.854 m)   Wt 125.2 kg (276 lb 0.3 oz)   SpO2 98%   BMI 36.42 kg/m²     Physical Exam   Constitutional: He is oriented to person, place, and time. He appears well-developed and well-nourished. He is active and cooperative.   HENT:   Head: Normocephalic and atraumatic.   Nose: Nose normal.   Mouth/Throat: Oropharynx is clear and moist. No oropharyngeal exudate.   Eyes: Conjunctivae and EOM are normal. Pupils are equal, round, and reactive to light. Right eye exhibits no discharge. Left eye exhibits no discharge. No scleral icterus.   Neck: Trachea normal, normal range of motion and full passive range of motion without pain. Neck supple. Normal carotid pulses and no JVD present. Carotid bruit is not present. No tracheal deviation present. No mass and no thyromegaly present.   Cardiovascular: Normal rate, regular rhythm, S1 normal, S2 normal, normal " heart sounds and intact distal pulses.  Exam reveals no gallop and no friction rub.    No murmur heard.  Pulmonary/Chest: Effort normal and breath sounds normal. No respiratory distress. He has no wheezes. He has no rales.   Abdominal: Soft. Normal appearance and bowel sounds are normal. He exhibits no distension and no mass. There is no hepatosplenomegaly. There is no tenderness. There is no rebound and no guarding. reducible umbilical hernia.  Neurological: He is alert and oriented to person, place, and time. He has normal strength. No cranial nerve deficit. Coordination normal.   Skin: Skin is warm, dry and intact. No rash noted. No cyanosis or erythema. No pallor. Nails show no clubbing.   Psychiatric: He has a normal mood and affect. His speech is normal and behavior is normal. Judgment and thought content normal.       Results for orders placed or performed in visit on 08/08/22   Comprehensive Metabolic Panel    Collection Time: 08/08/22  7:52 AM   Result Value Ref Range    Sodium 140 136 - 145 mmol/L    Potassium 4.5 3.5 - 5.1 mmol/L    Chloride 105 95 - 110 mmol/L    CO2 23 23 - 29 mmol/L    Glucose 117 (H) 70 - 110 mg/dL    BUN 18 6 - 20 mg/dL    Creatinine 1.0 0.5 - 1.4 mg/dL    Calcium 8.9 8.7 - 10.5 mg/dL    Total Protein 6.8 6.0 - 8.4 g/dL    Albumin 3.9 3.5 - 5.2 g/dL    Total Bilirubin 0.2 0.1 - 1.0 mg/dL    Alkaline Phosphatase 82 55 - 135 U/L    AST 25 10 - 40 U/L    ALT 25 10 - 44 U/L    Anion Gap 12 8 - 16 mmol/L    eGFR >60.0 >60 mL/min/1.73 m^2   Lipid Panel    Collection Time: 08/08/22  7:52 AM   Result Value Ref Range    Cholesterol 200 (H) 120 - 199 mg/dL    Triglycerides 489 (H) 30 - 150 mg/dL    HDL 33 (L) 40 - 75 mg/dL    LDL Cholesterol Invalid, Trig>400.0 63.0 - 159.0 mg/dL    HDL/Cholesterol Ratio 16.5 (L) 20.0 - 50.0 %    Total Cholesterol/HDL Ratio 6.1 (H) 2.0 - 5.0    Non-HDL Cholesterol 167 mg/dL     Assessment:       1. Preventative health care    2. Hyperlipidemia, unspecified  hyperlipidemia type    3. Abnormal glucose    4. Colon cancer screening    5. Essential hypertension    6. Suspected sleep apnea    7. Severe obesity (BMI 35.0-39.9) with comorbidity          Plan:       Preventative health care  -     TSH; Future; Expected date: 12/05/2024  -     Lipid panel; Future; Expected date: 12/05/2024  -     Hemoglobin A1c; Future; Expected date: 12/05/2024  -     Comprehensive metabolic panel; Future; Expected date: 12/05/2024    Hyperlipidemia, unspecified hyperlipidemia type    Abnormal glucose    Colon cancer screening  -     Fecal Immunochemical Test (iFOBT); Future; Expected date: 12/05/2024    Essential hypertension  -     lisinopriL 10 MG tablet; Take 1 tablet (10 mg total) by mouth once daily.  Dispense: 90 tablet; Refill: 3    Suspected sleep apnea  -     Home Sleep Study; Future    Severe obesity (BMI 35.0-39.9) with comorbidity    Labs today  Fit Kit  continue lisinopril 10mg daily  Continue weight loss efforts  Counseled on regular exercise, maintenance of a healthy weight, balanced diet rich in fruits/vegetables and lean protein, and avoidance of unhealthy habits like smoking and excessive alcohol intake.  F/u annually for physical with labs

## 2025-01-13 ENCOUNTER — TELEPHONE (OUTPATIENT)
Dept: FAMILY MEDICINE | Facility: CLINIC | Age: 50
End: 2025-01-13
Payer: COMMERCIAL

## 2025-01-13 DIAGNOSIS — G47.33 MODERATE OBSTRUCTIVE SLEEP APNEA: Primary | ICD-10-CM

## 2025-01-13 NOTE — TELEPHONE ENCOUNTER
Pulmonology referral to Dr. Castillo entered for evaluation and treatment of moderate sleep apnea seen on recent sleep study.